# Patient Record
Sex: MALE | Race: WHITE | NOT HISPANIC OR LATINO | Employment: OTHER | URBAN - METROPOLITAN AREA
[De-identification: names, ages, dates, MRNs, and addresses within clinical notes are randomized per-mention and may not be internally consistent; named-entity substitution may affect disease eponyms.]

---

## 2020-07-10 ENCOUNTER — CONVERSION ENCOUNTER (OUTPATIENT)
Dept: FAMILY MEDICINE CLINIC | Facility: CLINIC | Age: 75
End: 2020-07-10

## 2020-07-10 ENCOUNTER — OFFICE VISIT CONVERTED (OUTPATIENT)
Dept: FAMILY MEDICINE CLINIC | Facility: CLINIC | Age: 75
End: 2020-07-10
Attending: NURSE PRACTITIONER

## 2020-07-10 ENCOUNTER — HOSPITAL ENCOUNTER (OUTPATIENT)
Dept: FAMILY MEDICINE CLINIC | Facility: CLINIC | Age: 75
Discharge: HOME | End: 2020-07-10
Attending: NURSE PRACTITIONER

## 2020-07-10 LAB
ALBUMIN SERPL-MCNC: 4.3 G/DL (ref 3.5–5)
ALBUMIN/GLOB SERPL: 1.7 {RATIO} (ref 1.4–2.6)
ALP SERPL-CCNC: 70 U/L (ref 56–155)
ALT SERPL-CCNC: 40 U/L (ref 10–40)
ANION GAP SERPL CALC-SCNC: 18 MMOL/L (ref 8–19)
AST SERPL-CCNC: 33 U/L (ref 15–50)
BASOPHILS # BLD AUTO: 0.03 10*3/UL (ref 0–0.2)
BASOPHILS NFR BLD AUTO: 0.5 % (ref 0–3)
BILIRUB SERPL-MCNC: 0.47 MG/DL (ref 0.2–1.3)
BUN SERPL-MCNC: 19 MG/DL (ref 5–25)
BUN/CREAT SERPL: 16 {RATIO} (ref 6–20)
CALCIUM SERPL-MCNC: 9.3 MG/DL (ref 8.7–10.4)
CHLORIDE SERPL-SCNC: 106 MMOL/L (ref 99–111)
CHOLEST SERPL-MCNC: 127 MG/DL (ref 107–200)
CHOLEST/HDLC SERPL: 3 {RATIO} (ref 3–6)
CONV ABS IMM GRAN: 0.03 10*3/UL (ref 0–0.2)
CONV CO2: 23 MMOL/L (ref 22–32)
CONV IMMATURE GRAN: 0.5 % (ref 0–1.8)
CONV TOTAL PROTEIN: 6.8 G/DL (ref 6.3–8.2)
CREAT UR-MCNC: 1.19 MG/DL (ref 0.7–1.2)
DEPRECATED RDW RBC AUTO: 41.7 FL (ref 35.1–43.9)
EOSINOPHIL # BLD AUTO: 0.1 10*3/UL (ref 0–0.7)
EOSINOPHIL # BLD AUTO: 1.7 % (ref 0–7)
ERYTHROCYTE [DISTWIDTH] IN BLOOD BY AUTOMATED COUNT: 12.8 % (ref 11.6–14.4)
GFR SERPLBLD BASED ON 1.73 SQ M-ARVRAT: 60 ML/MIN/{1.73_M2}
GLOBULIN UR ELPH-MCNC: 2.5 G/DL (ref 2–3.5)
GLUCOSE SERPL-MCNC: 95 MG/DL (ref 70–99)
HCT VFR BLD AUTO: 44.2 % (ref 42–52)
HDLC SERPL-MCNC: 42 MG/DL (ref 40–60)
HGB BLD-MCNC: 14.4 G/DL (ref 14–18)
LDLC SERPL CALC-MCNC: 50 MG/DL (ref 70–100)
LYMPHOCYTES # BLD AUTO: 2.35 10*3/UL (ref 1–5)
LYMPHOCYTES NFR BLD AUTO: 39.6 % (ref 20–45)
MCH RBC QN AUTO: 29.6 PG (ref 27–31)
MCHC RBC AUTO-ENTMCNC: 32.6 G/DL (ref 33–37)
MCV RBC AUTO: 90.8 FL (ref 80–96)
MONOCYTES # BLD AUTO: 0.4 10*3/UL (ref 0.2–1.2)
MONOCYTES NFR BLD AUTO: 6.7 % (ref 3–10)
NEUTROPHILS # BLD AUTO: 3.03 10*3/UL (ref 2–8)
NEUTROPHILS NFR BLD AUTO: 51 % (ref 30–85)
NRBC CBCN: 0 % (ref 0–0.7)
OSMOLALITY SERPL CALC.SUM OF ELEC: 296 MOSM/KG (ref 273–304)
PLATELET # BLD AUTO: 300 10*3/UL (ref 130–400)
PMV BLD AUTO: 11.5 FL (ref 9.4–12.4)
POTASSIUM SERPL-SCNC: 4.5 MMOL/L (ref 3.5–5.3)
PSA SERPL-MCNC: 4.59 NG/ML (ref 0–4)
RBC # BLD AUTO: 4.87 10*6/UL (ref 4.7–6.1)
SODIUM SERPL-SCNC: 142 MMOL/L (ref 135–147)
TRIGL SERPL-MCNC: 177 MG/DL (ref 40–150)
TSH SERPL-ACNC: 1.41 M[IU]/L (ref 0.27–4.2)
VLDLC SERPL-MCNC: 35 MG/DL (ref 5–37)
WBC # BLD AUTO: 5.94 10*3/UL (ref 4.8–10.8)

## 2020-07-11 LAB — 25(OH)D3 SERPL-MCNC: 55.7 NG/ML (ref 30–100)

## 2020-07-29 ENCOUNTER — OFFICE VISIT CONVERTED (OUTPATIENT)
Dept: SURGERY | Facility: CLINIC | Age: 75
End: 2020-07-29
Attending: NURSE PRACTITIONER

## 2020-07-29 ENCOUNTER — CONVERSION ENCOUNTER (OUTPATIENT)
Dept: SURGERY | Facility: CLINIC | Age: 75
End: 2020-07-29

## 2020-10-13 ENCOUNTER — OFFICE VISIT CONVERTED (OUTPATIENT)
Dept: FAMILY MEDICINE CLINIC | Facility: CLINIC | Age: 75
End: 2020-10-13
Attending: NURSE PRACTITIONER

## 2020-11-19 ENCOUNTER — HOSPITAL ENCOUNTER (OUTPATIENT)
Dept: FAMILY MEDICINE CLINIC | Facility: CLINIC | Age: 75
Discharge: HOME | End: 2020-11-19
Attending: NURSE PRACTITIONER

## 2020-11-19 LAB — PSA SERPL-MCNC: 3.8 NG/ML (ref 0–4)

## 2021-01-13 ENCOUNTER — OFFICE VISIT CONVERTED (OUTPATIENT)
Dept: FAMILY MEDICINE CLINIC | Facility: CLINIC | Age: 76
End: 2021-01-13
Attending: NURSE PRACTITIONER

## 2021-01-15 ENCOUNTER — HOSPITAL ENCOUNTER (OUTPATIENT)
Dept: ULTRASOUND IMAGING | Facility: HOSPITAL | Age: 76
Discharge: HOME | End: 2021-01-15
Attending: NURSE PRACTITIONER

## 2021-01-18 ENCOUNTER — CONVERSION ENCOUNTER (OUTPATIENT)
Dept: GASTROENTEROLOGY | Facility: CLINIC | Age: 76
End: 2021-01-18
Attending: INTERNAL MEDICINE

## 2021-01-25 ENCOUNTER — CONVERSION ENCOUNTER (OUTPATIENT)
Dept: OTOLARYNGOLOGY | Facility: CLINIC | Age: 76
End: 2021-01-25
Attending: NURSE PRACTITIONER

## 2021-03-04 ENCOUNTER — HOSPITAL ENCOUNTER (OUTPATIENT)
Dept: GASTROENTEROLOGY | Facility: HOSPITAL | Age: 76
Setting detail: HOSPITAL OUTPATIENT SURGERY
Discharge: HOME | End: 2021-03-04
Attending: INTERNAL MEDICINE

## 2021-04-14 ENCOUNTER — OFFICE VISIT CONVERTED (OUTPATIENT)
Dept: FAMILY MEDICINE CLINIC | Facility: CLINIC | Age: 76
End: 2021-04-14
Attending: NURSE PRACTITIONER

## 2021-04-14 ENCOUNTER — CONVERSION ENCOUNTER (OUTPATIENT)
Dept: FAMILY MEDICINE CLINIC | Facility: CLINIC | Age: 76
End: 2021-04-14

## 2021-04-14 ENCOUNTER — HOSPITAL ENCOUNTER (OUTPATIENT)
Dept: FAMILY MEDICINE CLINIC | Facility: CLINIC | Age: 76
Discharge: HOME | End: 2021-04-14
Attending: NURSE PRACTITIONER

## 2021-04-14 LAB
25(OH)D3 SERPL-MCNC: 45.2 NG/ML (ref 30–100)
ALBUMIN SERPL-MCNC: 4.3 G/DL (ref 3.5–5)
ALBUMIN/GLOB SERPL: 1.6 {RATIO} (ref 1.4–2.6)
ALP SERPL-CCNC: 61 U/L (ref 56–155)
ALT SERPL-CCNC: 26 U/L (ref 10–40)
ANION GAP SERPL CALC-SCNC: 17 MMOL/L (ref 8–19)
AST SERPL-CCNC: 31 U/L (ref 15–50)
BILIRUB SERPL-MCNC: 0.57 MG/DL (ref 0.2–1.3)
BUN SERPL-MCNC: 15 MG/DL (ref 5–25)
BUN/CREAT SERPL: 11 {RATIO} (ref 6–20)
CALCIUM SERPL-MCNC: 9.2 MG/DL (ref 8.7–10.4)
CHLORIDE SERPL-SCNC: 105 MMOL/L (ref 99–111)
CHOLEST SERPL-MCNC: 141 MG/DL (ref 107–200)
CHOLEST/HDLC SERPL: 2.9 {RATIO} (ref 3–6)
CONV CO2: 24 MMOL/L (ref 22–32)
CONV TOTAL PROTEIN: 7 G/DL (ref 6.3–8.2)
CREAT UR-MCNC: 1.33 MG/DL (ref 0.7–1.2)
GFR SERPLBLD BASED ON 1.73 SQ M-ARVRAT: 52 ML/MIN/{1.73_M2}
GLOBULIN UR ELPH-MCNC: 2.7 G/DL (ref 2–3.5)
GLUCOSE SERPL-MCNC: 94 MG/DL (ref 70–99)
HDLC SERPL-MCNC: 48 MG/DL (ref 40–60)
LDLC SERPL CALC-MCNC: 56 MG/DL (ref 70–100)
OSMOLALITY SERPL CALC.SUM OF ELEC: 293 MOSM/KG (ref 273–304)
POTASSIUM SERPL-SCNC: 4.6 MMOL/L (ref 3.5–5.3)
SODIUM SERPL-SCNC: 141 MMOL/L (ref 135–147)
TRIGL SERPL-MCNC: 183 MG/DL (ref 40–150)
VLDLC SERPL-MCNC: 37 MG/DL (ref 5–37)

## 2021-05-10 NOTE — H&P
"   History and Physical      Patient Name: Aristides Pineda   Patient ID: [id number]   Sex: Male   YOB: 1945    Primary Care Provider: Zaid AMOR   Referring Provider: Zaid AMOR    Visit Date: July 29, 2020    Provider: ZULEYMA Garcia   Location: [facility]   Location Address: [address] [place] [address]  [id number]   Location Phone: [phone]          Chief Complaint  · \"My doctor sent me because my blood test is high\"      History Of Present Illness  The patient is a 75 year old male, who presents on referral from Zaid AMOR, for an urological evaluation for an elevated PSA. The PSA was noted as elevated on 07/10/2020 and stated to be mildly elevated and at a level of 4.59 . There has not been a repeat PSA since the last elevated one on 07/10/2020   The patient also reports slowing of his stream and nocturia, 2-3 times a night. Additionally, he denies burning, chills, fever, frequency, hematuria, hesitancy, incomplete emptying, and previous UTI's.   The patient is currently not taking any Urology specific medications.   The patient's past medical history is non-contributory.   Petinent studies:  To date, no diagnostic studies have been performed. He has not had any recent care for this condition.      Patient admits to decreased urinary stream, trouble starting stream and reports it seems to cut in & out. Denies any scrotum or penis pain.  Denies any family history of prostate cancer.     No previous psa's available.       Past Medical History  Disease Name Date Onset Notes   Arthritis --  --    Colon cancer screening 8-10 years ago normal per patient   Deafness --  --    Hyperlipidemia --  --    Hypertension --  --    Ringing in ears --  --    Shortness of Breath --  --          Past Surgical History  Procedure Name Date Notes   Spinal Fusion [id number] --          Medication List  Name Date Started Instructions   aspirin 81 mg oral tablet,delayed release (/EC)  " take 1 tablet (81 mg) by oral route once daily   Bactrim -160 mg oral tablet 07/29/2020 take 1 tablet by oral route every 12 hours for 20 days   Fish Oil 1,000 mg (120 mg-180 mg) oral capsule  take 1 capsule by oral route once a day (in the morning)   flaxseed oil 1400 mg oral capsule  take 1 tab qd   lisinopril 10 mg oral tablet  take 1 tablet by oral route once a day (at bedtime)   Multivitamin  Take 1 tab qd   rosuvastatin 5 mg oral tablet  take 1 tablet (5 mg) by oral route once daily at bedtime   Vitamin B  Take 1 tab qd   Vitamin D 1000 iu  take 1 tab qd         Allergy List  Allergen Name Date Reaction Notes   NO KNOWN DRUG ALLERGIES --  --  --        Allergies Reconciled  Family Medical History  Disease Name Relative/Age Notes   Family history of cancer  --    Family history of diabetes mellitus  --          Social History  Finding Status Start/Stop Quantity Notes   Tobacco Former --/-- --  --          Review of Systems  · Constitutional  o Denies  o : fever, chills  · HENT  o Denies  o : sore throat, nasal congestion, nasal discharge, sinus pain, headaches  · Cardiovascular  o Denies  o : chest pain, cardiac murmurs, irregular heart beats, dyspnea on exertion  · Respiratory  o Denies  o : shortness of breath, wheezing  · Gastrointestinal  o Denies  o : nausea, vomiting, change in abdominal girth, diarrhea, constipation, blood in stools  · Genitourinary  o Admits  o : nocturia, urinary hesitancy, decreased stream  o Denies  o : urgency, frequency, dysuria, hematuria, pyuria, oliguria, change in urine color, incontinence, urinary retention, difficulty voiding, post-void dribbling, additional symptoms, except as noted in HPI  · Integument  o Denies  o : rash, itching, new skin lesions  · Neurologic  o Denies  o : tingling or numbness, incoordination, seizures  · Endocrine  o Denies  o : polyuria, polydipsia, cold intolerance, heat intolerance, weight gain, weight loss  · Psychiatric  o Denies  o :  "anxiety, depression      Vitals  Date Time BP Position Site L\R Cuff Size HR RR TEMP (F) WT  HT  BMI kg/m2 BSA m2 O2 Sat        07/29/2020 03:09 PM       12  171lbs 2oz 5'  6\" 27.62 1.9           Physical Examination  · Constitutional  o Appearance  o : Well nourished, well developed patient in no acute distress. Ambulating without difficulty.  · Neck  o Thyroid  o : Normal size without tenderness, nodules or masses  · Respiratory  o Respiratory Effort  o : Breathing is unlabored without accessory muscle use  o Auscultation of Lungs  o : Normal breath sounds  · Gastrointestinal  o Abdominal Examination  o : Scaphoid abdomen which is non-tender to palpation with normal tone and without rigidity or guarding. Normal bowel sounds. No masses present.  o Liver and spleen  o : No hepatomegaly present. Liver is non-tender to palpation and spleen is not palpable.  o Hernias  o : No abdominal wall hernias are present.  · Genitourinary  o Penis  o : Normal appearance without lesion, discharge or masses.  · Lymphatic  o Neck  o : No lymphadenopathy present  o Axilla  o : No lymphadenopathy present  o Groin  o : No lymphadenopathy present  · Skin and Subcutaneous Tissue  o General Inspection  o : No rashes, lesions or areas of discoloration present. Skin turgor is normal.  o General Palpation  o : No abnormalities, masses or tenderness on palpation.          Assessment  · Elevated prostate specific antigen (PSA)     790.93/R97.20  · Nocturia     788.43/R35.1      Plan  · Orders  o PSA ultrasensitive DIAGNOSTIC [facility] (77[address]) - 790.93/R97.20, 788.43/R35.1 - 09/09/2020  · Medications  o Medications have been Reconciled  o Transition of Care or Provider Policy  · Instructions  o The patient's PSA is elevated on initial exam. I have discussed the causes of an elevated PSA. I have made recommendations and I have the patient return in follow-up for a recheck of the PSA.  o Electronically Identified Patient Education Materials Provided " Electronically            Electronically Signed by: ZULEYMA Garcia -Author on July 29, 2020 08:44:27 PM

## 2021-05-10 NOTE — H&P
History and Physical      Patient Name: Aristides Pineda   Patient ID: [id number]   Sex: Male   YOB: 1945    Primary Care Provider: James AMOR   Referring Provider: James AMOR    Visit Date: January 18, 2021    Provider: Rell Barreto MD   Location: [facility]   Location Address: [address] [place] [address]  [id number]   Location Phone: [phone]          Chief Complaint  · Surgical History and Physical  · Screening Colonoscopy      History Of Present Illness  NON-INPATIENT HISTORY AND PHYSICAL  Allergies: NO KNOWN DRUG ALLERGIES and bromocriptine   Chief Complaint/History of Present Illness: Colon screening, No family history of colon cancer   Colon Recall: No   Failed Outpatient Treatment/Contraindications: N/A   Current Medications: aspirin 81 mg oral tablet,delayed release (DR/EC), Fish Oil 1,000 mg (120 mg-180 mg) oral capsule, flaxseed oil 1400 mg oral capsule, lisinopril 10 mg oral tablet, Multivitamin, rosuvastatin 5 mg oral tablet, Suprep Bowel Prep Kit 17.5-3.13-1.6 gram oral recon soln, Vitamin B, and Vitamin D 1000 iu   Significant Past Medical History: Arthritis, Colon cancer screening, Deafness, Hyperlipidemia, Hypertension, Ringing in ears, and Shortness of Breath   Significant Family Medical History: No family history of colon cancer   Significant Past Surgical History: Spinal Fusion   Previous Colonoscopy: Yes YEAR: 2011 By Whom: [place]   Previous EGD: No   PHYSICAL EXAM:  Heart: Regular Rate and Rhythm   Lungs: Breathing Unlabored           Assessment  · Preoperative examination     V72.84/Z01.818  · Screening for colon cancer     V76.51/Z12.11      Plan  · Orders  o Consent for Colonoscopy Screening -Possible risk/complications, benefits, and alternatives to surgical or invasive procedure have been explained to patient and/or legal gaurdian. -Patient has been evaluated and can tolerate anethesia and/or sedation. Risk, benefits, and  alternatives to anesthesia and sedation have been explained to patient or legal gaurdian. () - V72.84/Z01.818, V76.51/Z12.11 - 03/04/2021  · Medications  o Medications have been Reconciled  o Transition of Care or Provider Policy  · Instructions  o ****Surgical Orders****  o ***************  o Outpatient  o ***************  o RISK AND BENEFITS:  o Possible risks/complications, benefits and alternatives to surgical or invasive procedure have been explained to the patient and/or legal guardian.  o Patient has been evaluated and can tolerate anesthesia and/or sedation. Risks, benefits, and alternatives to anesthesia and sedation have been explained to the patient and/or legal guardian.  o ***************  o PREP: Per protocol  o IV: Per Anesthesia  o The above History and Physical Examination has been completed within 30 days of admission.  o This note has been transcribed by KAT Husain. I have read and agree with the findings in this note.  o Electronically Identified Patient Education Materials Provided Electronically            Electronically Signed by: Ching Reyes MA -Author on January 18, 2021 11:18:18 AM

## 2021-05-10 NOTE — H&P
History and Physical      Patient Name: Aristides Pineda   Patient ID: [id number]   Sex: Male   YOB: 1945        Visit Date: July 10, 2020    Provider: ZULEYMA Burns   Location: [facility]   Location Address: [address] [place] [address], [address] [place] [address]  [id number]   Location Phone: [phone]          Chief Complaint  · NEW PT - EST CARE      History Of Present Illness  Aristides Pineda is a 75 year old male who presents for evaluation and treatment of:      Patient is here to establish care.  He recently to [place] from [place] 2 months ago because his family lives here.  He has a history of arthritis, hypertension, hyperlipidemia, shortness of breath when exercising, and is hard of hearing.  He states that he takes all his medications as prescribed and does not take any additional over-the-counter medications.    He has a history of tobacco use he quit in 1971.  He smoked for 6 to 8 years about a pack a day.  He denies any alcohol or substance abuse.    He is up-to-date on his pneumonia vaccine and had a colonoscopy 8 years ago with normal results.    He has not complaints at this time.       Past Medical History  No Pertinent Medical History         Past Surgical History  Spinal Fusion         Medication List  aspirin 81 mg oral tablet,delayed release (DR/EC); Fish Oil 1,000 mg (120 mg-180 mg) oral capsule; flaxseed oil 1400 mg oral capsule; lisinopril 10 mg oral tablet; Multivitamin; rosuvastatin 5 mg oral tablet; Vitamin B; Vitamin D 1000 iu         Allergy List  NO KNOWN DRUG ALLERGIES         Family Medical History  Family history of cancer; Family history of diabetes mellitus         Social History  Tobacco (Former)         Review of Systems  · Constitutional  o Denies  o : fever, fatigue, weight loss, weight gain  · Eyes  o Denies  o : impaired vision  · HENT  o Admits  o : hearing loss  o Denies  o : headaches  · Cardiovascular  o Denies  o : lower extremity edema, claudication, chest  "pressure, palpitations  · Respiratory  o Admits  o : shortness of breath with exercise  o Denies  o : wheezing, cough, hemoptysis, dyspnea on exertion  · Gastrointestinal  o Denies  o : nausea, vomiting, diarrhea, constipation, abdominal pain  · Genitourinary  o Denies  o : urgency, frequency, dysuria, incontinence, urinary retention, difficulty voiding, decreased stream  · Integument  o Denies  o : skin dryness, hair growth change  · Neurologic  o Admits  o : occasional dizziness with change in position from kneeling to standing  o Denies  o : altered mental status, muscular weakness, difficulty concentrating  · Musculoskeletal  o Denies  o : joint pain, muscular weakness, muscle cramps  · Endocrine  o Denies  o : loss of hair, constipation  · Psychiatric  o Denies  o : anxiety, depression      Vitals  Date Time BP Position Site L\R Cuff Size HR RR TEMP (F) WT  HT  BMI kg/m2 BSA m2 O2 Sat        07/10/2020 09:02 /58 Sitting    58 - R  97.5 170lbs 4oz 5'  6\" 27.48 1.9 98 %          Physical Examination  · Constitutional  o Appearance  o : well-nourished, well developed, alert, in no acute distress, well-tended appearance  · Head and Face  o Head  o :   § Inspection  § : atraumatic, normocephalic  o Face  o :   § Inspection  § : no facial lesions  o HEENT  o : Unremarkable  · Eyes  o Conjunctivae  o : conjunctivae normal  o Sclerae  o : sclerae white  o Pupils and Irises  o : pupils equal and round, pupils reactive to light bilaterally  o Eyelids/Ocular Adnexae  o : eyelid appearance normal  · Ears, Nose, Mouth and Throat  o Ears  o :   § External Ears  § : appearance within normal limits, no lesions present  § Otoscopic Examination  § : tympanic membrane appearance within normal limits bilaterally  o Nose  o :   § External Nose  § : appearance normal  o Oral Cavity  o :   § Oral Mucosa  § : oral mucosa normal  § Lips  § : lip appearance normal  § Teeth  § : normal dentition for age  § Gums  § : gums pink, " non-swollen, no bleeding present  § Tongue  § : tongue appearance normal  § Palate  § : hard palate normal, soft palate appearance normal  o Throat  o : No erythema throat, tonsils +1  · Neck  o Inspection/Palpation  o : normal appearance, no masses or tenderness, trachea midline  o Thyroid  o : gland size normal, nontender, no nodules or masses present on palpation  · Respiratory  o Respiratory Effort  o : breathing unlabored, no accessory muscle use  o Auscultation of Lungs  o : normal breath sounds  · Cardiovascular  o Heart  o :   § Auscultation of Heart  § : regular rate, normal rhythm, no murmurs present  o Peripheral Vascular System  o :   § Extremities  § : no edema  · Gastrointestinal  o Abdominal Examination  o : abdomen nontender to palpation, normal bowel sounds, tone normal without rigidity or guarding, no masses present  o Liver and spleen  o : no hepatomegaly present  · Lymphatic  o Neck  o : no lymphadenopathy   o Supraclavicular Nodes  o : no supraclavicular nodes          Assessment  · Screening for depression     V79.0/Z13.89  · Screening for prostate cancer     V76.44/Z12.5  Check PSA  · Essential hypertension     401.9/I10  Check CBC, CMP, and TSH  · Hyperlipidemia     272.4/E78.5  Check lipid panel  · Osteoarthritis     715.90/M19.90  · Vitamin D deficiency     268.9/E55.9  Check vitamin D  · Hard of hearing     389.9/H91.90  · Shortness of breath on exertion     786.05/R06.02    Problems Reconciled  Plan  · Orders  o Annual depression screening, 15 minutes (, 43392) - V79.0/Z13.89 - 07/10/2020  o ACO - Pt declines to or was not able to provide an Advance Care Plan or name a Surrogate Decision Maker (1124F) - - 07/10/2020  o Male Physical Primary Care Panel (CMP, CBC, TSH, Lipid, PSA) [facility] (27191, 88328, 04323, 73932, 62202, ) - 272.4/E78.5, 401.9/I10, V76.44/Z12.5 - 07/10/2020  o Vitamin D (25-Hydroxy) Level (52777) - 268.9/E55.9 - 07/10/2020  o ACO-39: Current medications updated and  reviewed () - - 07/10/2020  o ACO-18: Negative screen for clinical depression using a standardized tool () - - 07/10/2020   0 pts  o ACO-15: Pneumococcal Vaccine Administered or Previously Received (4040F) - - 07/10/2020  o ACO-14: Influenza immunization was not administered for reasons documented () - - 07/10/2020   pt did not receive  o ACO-13: Fall Risk Screening with no falls in past year or only one fall without injury in the past year (1101F) - - 07/10/2020  · Medications  o Medications have been Reconciled  o Transition of Care or Provider Policy  · Instructions  o Depression Screen completed and scanned into the EMR under the designated folder within the patient's documents.  o Today's PHQ-9 result is _0__  o Patient advised to monitor blood pressure (B/P) at home and journal readings. Patient informed that a B/P reading at home of more than 130/80 is considered hypertension. For readings greater vtbl262/90 or higher patient is advised to follow up in the office with readings for management. Patient advised to limit sodium intake.  o Advised that cheeses and other sources of dairy fats, animal fats, fast food, and the extras (candy, pastries, pies, doughnuts and cookies) all contain LDL raising nutrients. Advised to increase fruits, vegetables, whole grains, and to monitor portion sizes.   o Patient was educated/instructed on their diagnosis, treatment and medications prior to discharge from the clinic today.  o Patient instructed to seek medical attention urgently for new or worsening symptoms.  o Call the office with any concerns or questions.  · Disposition  o Call or Return if symptoms worsen or persist.  o f/u 3 months            Electronically Signed by: Zaid Love APRN -Author on July 10, 2020 09:51:47 AM

## 2021-05-10 NOTE — H&P
History and Physical      Patient Name: Aristides Pineda   Patient ID: [id number]   Sex: Male   YOB: 1945    Primary Care Provider: James AMOR   Referring Provider: James AMOR    Visit Date: January 25, 2021    Provider: ZULEYMA Kasper   Location: [facility]   Location Address: [address] [place] [address], [address] [place] [address]  [id number]   Location Phone: [phone]          Chief Complaint     1.  Hearing loss    2.  Tinnitus       History Of Present Illness  Aristides Pineda is a 75 year old male who presents to the office today as a consult from James AMOR.      He presents to the clinic today for evaluation of his ears and hearing.  He reports for several years he has had ringing in both ears.  He states that he feels like his hearing has changed slightly.  He reports that he often has to turn his television up very loudly to be able to hear.  However, he states that he does not have much trouble in face-to-face conversation.  He denies any issues with his ears.  He has never had any recurrent otitis media, otalgia or otorrhea.  He reports riding a motorcycle for many years without hearing protection.       Past Medical History  Arthritis; Colon cancer screening; Deafness; Hyperlipidemia; Hypertension; Ringing in ears; Shortness of Breath; Tinnitus         Past Surgical History  Spinal Fusion; Tonsillectomy         Medication List  aspirin 81 mg oral tablet,delayed release (DR/EC); Fish Oil 1,000 mg (120 mg-180 mg) oral capsule; flaxseed oil 1400 mg oral capsule; lisinopril 10 mg oral tablet; Multivitamin; rosuvastatin 5 mg oral tablet; Suprep Bowel Prep Kit 17.5-3.13-1.6 gram oral recon soln; Vitamin B; Vitamin D 1000 iu         Allergy List  bromocriptine       Allergies Reconciled  Family Medical History  No family history of malignant neoplasm; Family history of cancer; Family history of diabetes mellitus         Social History  Tobacco (Former)  "        Review of Systems  · Constitutional  o Denies  o : fever, night sweats, weight loss  · Eyes  o Denies  o : discharge from eye, impaired vision  · HENT  o Admits  o : *See HPI  · Cardiovascular  o Denies  o : chest pain, irregular heart beats  · Respiratory  o Denies  o : shortness of breath, wheezing, coughing up blood  · Gastrointestinal  o Denies  o : heartburn, reflux, vomiting blood  · Genitourinary  o Denies  o : frequency  · Integument  o Denies  o : rash, skin dryness  · Neurologic  o Denies  o : seizures, loss of balance, loss of consciousness, dizziness  · Endocrine  o Denies  o : cold intolerance, heat intolerance  · Heme-Lymph  o Denies  o : easy bleeding, anemia      Vitals  Date Time BP Position Site L\R Cuff Size HR RR TEMP (F) WT  HT  BMI kg/m2 BSA m2 O2 Sat FR L/min FiO2 HC       01/25/2021 01:22 PM        97.3 185lbs 6oz 5'  6\" 29.92 1.98             Physical Examination  · Constitutional  o Appearance  o : well developed, well-nourished, alert and in no acute distress, voice clear and strong  · Head and Face  o Head  o :   § Inspection  § : no deformities or lesions  o Face  o :   § Inspection  § : No facial lesions; House-Brackmann I/VI bilaterally  § Palpation  § : No TMJ crepitus nor  muscle tenderness bilaterally  · Eyes  o Vision  o :   § Visual Fields  § : Extraocular movements are intact. No spontaneous or gaze-induced nystagmus.  o Conjunctivae  o : clear  o Sclerae  o : clear  o Pupils and Irises  o : pupils equal, round, and reactive to light.   · Ears, Nose, Mouth and Throat  o Ears  o :   § External Ears  § : appearance within normal limits, no lesions present  § Otoscopic Examination  § : tympanic membrane appearance within normal limits bilaterally without perforations, well-aerated middle ears  § Hearing  § : intact to conversational voice both ears  o Nose  o :   § External Nose  § : appearance normal  § Intranasal Exam  § : mucosa within normal limits, vestibules " normal, no intranasal lesions present, septum midline, sinuses non tender to percussion  o Oral Cavity  o :   § Oral Mucosa  § : oral mucosa normal without pallor or cyanosis  § Lips  § : lip appearance normal  § Teeth  § : normal dentition for age  § Gums  § : gums pink, non-swollen, no bleeding present  § Tongue  § : tongue appearance normal; normal mobility  § Palate  § : hard palate normal, soft palate appearance normal with symmetric mobility  o Throat  o :   § Oropharynx  § : no inflammation or lesions present, tonsils within normal limits  · Neck  o Inspection/Palpation  o : normal appearance, no masses or tenderness, trachea midline; thyroid size normal, nontender, no nodules or masses present on palpation  · Respiratory  o Respiratory Effort  o : breathing unlabored  o Inspection of Chest  o : normal appearance, no retractions  · Lymphatic  o Neck  o : no lymphadenopathy present  o Supraclavicular Nodes  o : no lymphadenopathy present  o Preauricular Nodes  o : no lymphadenopathy present  · Skin and Subcutaneous Tissue  o General Inspection  o : Regarding face and neck - there are no rashes present, no lesions present, and no areas of discoloration  · Neurologic  o Cranial Nerves  o : cranial nerves II-XII are grossly intact bilaterally  o Gait and Station  o : normal gait, able to stand without diffculty  · Psychiatric  o Judgement and Insight  o : judgment and insight intact  o Mood and Affect  o : mood normal, affect appropriate          Assessment  · Sensorineural hearing loss (SNHL), bilateral     389.18/H90.3  · Tinnitus, bilateral     388.30/H93.13      Plan  · Orders  o Audiometry, pure-tone (threshold); [facility] (87293) - 389.18/H90.3, 388.30/H93.13 - 01/25/2021  o Tympanogram (Impedance Testing) [facility] (27970) - 389.18/H90.3, 388.30/H93.13 - 01/25/2021  · Medications  o Medications have been Reconciled  o Transition of Care or Provider Policy  · Instructions  o He presents to the clinic today for  evaluation of his ears and hearing. He reports for several years he has had ringing in both ears. He states that he feels like his hearing has changed slightly. He reports that he often has to turn his television up very loudly to be able to hear. However, he states that he does not have much trouble in face-to-face conversation. He denies any issues with his ears. He has never had any recurrent otitis media, otalgia or otorrhea. He reports riding a motorcycle for many years without hearing protection. On examination today bilateral external auditory canals and bilateral tympanic membrane appearance is within normal limits. There are no perforations and middle ears are well aerated. We did obtain an audiogram and tympanogram today. Audiogram shows normal hearing sloping to moderately severe sensorineural hearing loss bilaterally. He essentially has normal to borderline normal hearing through 2000 Hz. Speech reception threshold was at 20 dB bilaterally. Word discrimination scores were 92% on the right and 100% on the left at 60 dB. Tympanograms were normal bilaterally. We have gone over the results of the audiogram and I have also given a copy to the patient. Given his speech audiometry scores he is a great candidate for hearing aids. However, at this time he does not feel like he is having much trouble in conversation. We discussed his tinnitus symptoms and how it is related to the high-frequency nerve type hearing loss. I have encouraged him to use background noise as a distraction technique to help with his tinnitus symptoms. I will plan to see him back on an as-needed basis.  o Electronically Identified Patient Education Materials Provided Electronically  · Correspondence  o ENT Letter to Referring MD (James AMOR) - 01/25/2021            Electronically Signed by: ZULEYMA Kasper -Author on January 25, 2021 02:37:15 PM

## 2021-05-13 NOTE — PROGRESS NOTES
Progress Note      Patient Name: Aristdies Pineda   Patient ID: [id number]   Sex: Male   YOB: 1945    Primary Care Provider: James AMOR   Referring Provider: James AMOR    Visit Date: October 13, 2020    Provider: ZULEYMA Ashley   [place] [facility] [place]   Location Address: [address] [place] [address], [address]  [place], [place]  [id number]   Location Phone: [phone]          Chief Complaint  · 3 mo f/u      History Of Present Illness  Aristides Pineda is a 75 year old male who presents for evaluation and treatment of:      Resents today for 3-month follow-up for hypertension hyperlipidemia.  He states his blood pressure at home is approximately 1201 26/6070.  Denies chest pain, syncope, and palpitations.  He declines a flu vaccine.  He is up-to-date on his pneumonia and colonoscopy.    He had an elevated PSA he is seeing urology and will have a repeat PSA.       Past Medical History  Arthritis; Colon cancer screening; Deafness; Hyperlipidemia; Hypertension; Ringing in ears; Shortness of Breath         Past Surgical History  Spinal Fusion         Medication List  aspirin 81 mg oral tablet,delayed release (DR/EC); Fish Oil 1,000 mg (120 mg-180 mg) oral capsule; flaxseed oil 1400 mg oral capsule; lisinopril 10 mg oral tablet; Multivitamin; rosuvastatin 5 mg oral tablet; Vitamin B; Vitamin D 1000 iu         Allergy List  NO KNOWN DRUG ALLERGIES         Family Medical History  Family history of cancer; Family history of diabetes mellitus         Social History  Tobacco (Former)         Review of Systems  · Constitutional  o Denies  o : fatigue  · Eyes  o Denies  o : blurred vision, changes in vision  · HENT  o Denies  o : headaches  · Cardiovascular  o Denies  o : chest pain, irregular heart beats, rapid heart rate, dyspnea on exertion  · Respiratory  o Denies  o : shortness of breath, wheezing, cough  · Gastrointestinal  o Denies  o : nausea, vomiting, diarrhea, constipation,  "abdominal pain, blood in stools, melena  · Genitourinary  o Denies  o : frequency, dysuria, hematuria  · Integument  o Denies  o : rash, new skin lesions  · Musculoskeletal  o Denies  o : joint pain, joint swelling, muscle pain  · Endocrine  o Denies  o : central obesity, polyuria, polydipsia  · Psychiatric  o Denies  o : anxiety, depression      Vitals  Date Time BP Position Site L\R Cuff Size HR RR TEMP (F) WT  HT  BMI kg/m2 BSA m2 O2 Sat FR L/min FiO2        10/13/2020 08:11 /60 Sitting    54 - R  96.8 175lbs 16oz 5'  6\" 28.41 1.93 99 %            Physical Examination  · Constitutional  o Appearance  o : alert, in no acute distress  · Head and Face  o Head  o :   § Inspection  § : atraumatic, normocephalic  o Face  o :   § Inspection  § : no facial lesions  o HEENT  o : Unremarkable  · Eyes  o Conjunctivae  o : conjunctivae normal  o Sclerae  o : sclerae white  o Pupils and Irises  o : pupils equal and round, pupils reactive to light bilaterally  o Eyelids/Ocular Adnexae  o : eyelid appearance normal  · Neck  o Inspection/Palpation  o : normal appearance, no masses or tenderness, trachea midline  o Thyroid  o : gland size normal, nontender, no nodules or masses present on palpation  · Respiratory  o Respiratory Effort  o : breathing unlabored  o Auscultation of Lungs  o : normal breath sounds  · Cardiovascular  o Heart  o :   § Auscultation of Heart  § : regular rate, normal rhythm, no murmurs present  o Peripheral Vascular System  o :   § Extremities  § : no edema  · Gastrointestinal  o Abdominal Examination  o : abdomen nontender to palpation, normal bowel sounds, tone normal without rigidity or guarding, no masses present  o Liver and spleen  o : no hepatomegaly present  · Lymphatic  o Neck  o : no lymphadenopathy           Assessment  · Need for influenza vaccination     V04.81/Z23  Declines flu vaccine  · Hyperlipidemia     272.4/E78.5  Continue the lovastatin 5 mg daily. His cholesterol is " controlled.  · Hypertension     401.9/I10  Blood pressure is controlled. Continue the lisinopril 10 mg daily      Plan  · Orders  o ACO-39: Current medications updated and reviewed (, 1159F) - - 10/13/2020  o ACO-15: Pneumococcal Vaccine Administered or Previously Received [facility] (4040F) - - 10/13/2020  · Medications  o lisinopril 10 mg oral tablet   SIG: take 1 tablet by oral route daily for 90 days   DISP: (90) Tablet with 1 refills  Prescribed on 10/13/2020     o rosuvastatin 5 mg oral tablet   SIG: take 1 tablet (5 mg) by oral route once daily at bedtime for 90 days   DISP: (90) Tablet with 1 refills  Prescribed on 10/13/2020     o Medications have been Reconciled  o Transition of Care or Provider Policy  · Instructions  o Flu vaccine declined.  o Advised that cheeses and other sources of dairy fats, animal fats, fast food, and the extras (candy, pastries, pies, doughnuts and cookies) all contain LDL raising nutrients. Advised to increase fruits, vegetables, whole grains, and to monitor portion sizes.   o Patient was educated/instructed on their diagnosis, treatment and medications prior to discharge from the clinic today.  o Patient instructed to seek medical attention urgently for new or worsening symptoms.  o Call the office with any concerns or questions.  · Disposition  o Call or Return if symptoms worsen or persist.  o f/u 3 months            Electronically Signed by: ZULEYMA Ashley -Author on October 13, 2020 08:27:41 AM

## 2021-05-14 VITALS
HEIGHT: 66 IN | BODY MASS INDEX: 29.25 KG/M2 | SYSTOLIC BLOOD PRESSURE: 134 MMHG | HEART RATE: 68 BPM | TEMPERATURE: 98.2 F | DIASTOLIC BLOOD PRESSURE: 76 MMHG | WEIGHT: 182 LBS | OXYGEN SATURATION: 99 %

## 2021-05-14 VITALS
HEART RATE: 54 BPM | OXYGEN SATURATION: 99 % | BODY MASS INDEX: 28.28 KG/M2 | WEIGHT: 176 LBS | HEIGHT: 66 IN | DIASTOLIC BLOOD PRESSURE: 60 MMHG | TEMPERATURE: 96.8 F | SYSTOLIC BLOOD PRESSURE: 134 MMHG

## 2021-05-14 VITALS
OXYGEN SATURATION: 97 % | BODY MASS INDEX: 29.49 KG/M2 | SYSTOLIC BLOOD PRESSURE: 130 MMHG | TEMPERATURE: 97 F | WEIGHT: 183.5 LBS | HEART RATE: 62 BPM | DIASTOLIC BLOOD PRESSURE: 72 MMHG | HEIGHT: 66 IN

## 2021-05-14 VITALS — BODY MASS INDEX: 29.79 KG/M2 | HEIGHT: 66 IN | TEMPERATURE: 97.3 F | WEIGHT: 185.37 LBS

## 2021-05-14 NOTE — PROGRESS NOTES
Progress Note      Patient Name: Aristides Pineda   Patient ID: [id number]   Sex: Male   YOB: 1945    Primary Care Provider: James AMOR   Referring Provider: James AMOR    Visit Date: April 14, 2021    Provider: ZULEYMA Ashley   [facility]   Location Address: [address] [place] [address], [address] [place] [address]  [id number]   Location Phone: [phone]          Chief Complaint  · 3 month f/u      History Of Present Illness  Aristides Pineda is a 75 year old male who presents for evaluation and treatment of:      Presents today for 3-month follow-up on hypertension, hyperlipidemia, and vitamin D deficiency.  Overall he states he is doing well.  Blood pressure today is 130/72.  Denies chest pain, syncope, palpitations.  He is a former smoker.  He only smoked for possibly 10 years.  Quit when he was 26 years old.       Past Medical History  Arthritis; Colon cancer screening; Deafness; Hyperlipidemia; Hypertension; Ringing in ears; Shortness of Breath; Tinnitus         Past Surgical History  Colonoscopy; Spinal Fusion; Tonsillectomy         Medication List  aspirin 81 mg oral tablet,delayed release (DR/EC); Fish Oil 1,000 mg (120 mg-180 mg) oral capsule; flaxseed oil 1400 mg oral capsule; lisinopril 10 mg oral tablet; Multivitamin; rosuvastatin 5 mg oral tablet; Vitamin B; Vitamin D 1000 iu         Allergy List  bromocriptine       Allergies Reconciled  Family Medical History  No family history of malignant neoplasm; Family history of cancer; Family history of diabetes mellitus         Social History  Tobacco (Former)         Review of Systems  · Constitutional  o Denies  o : fatigue, fever, body aches, night sweats  · Eyes  o Denies  o : double vision, blurred vision  · HENT  o Denies  o : vertigo, recent head injury  · Cardiovascular  o Denies  o : chest pain, irregular heart beats, rapid heart rate, dyspnea on exertion, lower extremity  "edema  · Respiratory  o Denies  o : shortness of breath, productive cough  · Gastrointestinal  o Denies  o : nausea, vomiting, reflux, abdominal pain  · Genitourinary  o Denies  o : dysuria, urinary retention  · Integument  o Denies  o : hair growth change, new skin lesions  · Neurologic  o Denies  o : altered mental status, seizures  · Musculoskeletal  o Denies  o : joint swelling, limitation of motion  · Endocrine  o Denies  o : cold intolerance, heat intolerance  · Heme-Lymph  o Denies  o : petechiae, lymph node enlargement or tenderness  · Allergic-Immunologic  o Denies  o : frequent illnesses      Vitals  Date Time BP Position Site L\R Cuff Size HR RR TEMP (F) WT  HT  BMI kg/m2 BSA m2 O2 Sat FR L/min FiO2 HC       04/14/2021 09:05 /72 Sitting    62 - R  97 183lbs 8oz 5'  6\" 29.62 1.97 97 %            Physical Examination  · Constitutional  o Appearance  o : alert, in no acute distress  · Head and Face  o Head  o :   § Inspection  § : atraumatic, normocephalic  o Face  o :   § Inspection  § : no facial lesions  o HEENT  o : Unremarkable  · Eyes  o Conjunctivae  o : conjunctivae normal  o Sclerae  o : sclerae white  o Pupils and Irises  o : pupils equal and round, pupils reactive to light bilaterally  o Eyelids/Ocular Adnexae  o : eyelid appearance normal  · Ears, Nose, Mouth and Throat  o Ears  o :   § External Ears  § : appearance within normal limits, no lesions present  o Nose  o :   § External Nose  § : appearance normal  o Oral Cavity  o :   § Oral Mucosa  § : oral mucosa normal  § Lips  § : lip appearance normal  § Teeth  § : normal dentition for age  § Gums  § : gums pink, non-swollen, no bleeding present  § Tongue  § : tongue appearance normal  § Palate  § : hard palate normal, soft palate appearance normal  · Neck  o Inspection/Palpation  o : normal appearance, no masses or tenderness, trachea midline  o Thyroid  o : gland size normal, nontender, no nodules or masses present on " palpation  · Respiratory  o Respiratory Effort  o : breathing unlabored  o Auscultation of Lungs  o : normal breath sounds  · Cardiovascular  o Heart  o :   § Auscultation of Heart  § : regular rate, normal rhythm, no murmurs present  o Peripheral Vascular System  o :   § Extremities  § : no edema  · Gastrointestinal  o Abdominal Examination  o : abdomen nontender to palpation, normal bowel sounds, tone normal without rigidity or guarding, no masses present  o Liver and spleen  o : no hepatomegaly present  · Lymphatic  o Neck  o : no lymphadenopathy   o Supraclavicular Nodes  o : no supraclavicular nodes          Assessment  · Essential hypertension     401.9/I10  . continue current regimen. Check CMP  · Hyperlipidemia     272.4/E78.5  Well-controlled. Continue current regimen. Check lipid panel  · Vitamin D deficiency     268.9/E55.9  Check vitamin D      Plan  · Orders  o HTN/Lipid Panel (CMP, Lipid) [facility] ([address]56, [address]) - 401.9/I10 - 04/14/2021  o Vitamin D Level (28035) - 268.9/E55.9 - 04/14/2021  o ACO-15: Pneumococcal Vaccine Administered or Previously Received [facility] (4040F) - - 04/14/2021   pt recieved at another office.  o ACO-14: Influenza immunization was not administered for reasons documented [facility] () - - 04/14/2021   pt declined  o ACO-39: Current medications updated and reviewed (1159F, ) - - 04/14/2021  · Medications  o lisinopril 10 mg oral tablet   SIG: take 1 tablet by oral route daily for 90 days   DISP: (90) Tablet with 1 refills  Refilled on 04/14/2021     o rosuvastatin 5 mg oral tablet   SIG: take 1 tablet (5 mg) by oral route once daily at bedtime for 90 days   DISP: (90) Tablet with 1 refills  Refilled on 04/14/2021     · Instructions  o Patient advised to monitor blood pressure (B/P) at home and journal readings. Patient informed that a B/P reading at home of more than 130/80 is considered hypertension. For readings greater tudm205/90 or higher patient is advised to follow up in the  office with readings for management. Patient advised to limit sodium intake.  o Advised that cheeses and other sources of dairy fats, animal fats, fast food, and the extras (candy, pastries, pies, doughnuts and cookies) all contain LDL raising nutrients. Advised to increase fruits, vegetables, whole grains, and to monitor portion sizes.   o Patient was educated/instructed on their diagnosis, treatment and medications prior to discharge from the clinic today.  o Patient instructed to seek medical attention urgently for new or worsening symptoms.  o Call the office with any concerns or questions.  · Disposition  o Call or Return if symptoms worsen or persist.  o f/u 3 months            Electronically Signed by: ZULEYMA Ashley -Author on April 14, 2021 09:49:26 AM

## 2021-05-14 NOTE — PROGRESS NOTES
Progress Note      Patient Name: Aristides Pineda   Patient ID: [id number]   Sex: Male   YOB: 1945    Primary Care Provider: James AMOR   Referring Provider: James AMOR    Visit Date: January 13, 2021    Provider: ZULEYMA Ashley   Location: [facility]   Location Address: [address] [place] [address], [address] [place] [address]  [id number]   Location Phone: [phone]          Chief Complaint  · Welcome to Medicare Visit      History Of Present Illness  The patient is a 75 year old male who has come to this office for his Welcome to Medicare Visit. His Primary Care Provider is James AMOR. His comprehensive Care Team list, including suppliers, has been updated on the Facesheet. His medical/surgical/family history, height, weight, BMI, and blood pressure have been reviewed and are in the chart.   Medications are listed in the medication list.   The active problem list includes: Arthritis, Deafness, Hyperlipidemia, Hypertension, Ringing in ears, and Shortness of Breath   The patient does not have a history of substance use.   Patient reports his diet is adequate.   Patient reports his physical activity is adequate.   A hearing loss screen was completed today and the result is positive, indicating a need for further evaluation.   Patient does not have any risk factors for depression. Patient completed the PHQ-9 today and it has been scanned in the chart. The total score is 1-4.   The Timed-Up-and-Go screen was administered today and the result is negative.   The Zarco Index of [place] in ADLs indicated full function (score of 6).   A Falls Risk Assessment has been completed, including a review of home fall hazards and medication review.   His level of safety is noted to be within normal limits. His balance/gait is within normal limits. There have been no falls in the past year. Patient-specific home safety recommendations have been reviewed and a copy has been given  to patient.   He admits issues with leaking urine. This happens infrequently and he would like to discuss this further today.   There are no additional risk factors identified.   Living Will/Advanced Directive has not previously been completed.   Personalized health advice was given to the patient and a written health screening schedule was established; see Plan for details.      He has a history of hypertension and hyperlipidemia.  He does not check his blood pressure at home.  BP slightly elevated 134/76.  He states he will start checking his blood pressure at home.  Denies chest pain, syncope, palpitation.    He has a history of tinnitus in both ears.  He does not recall when the tinnitus is started.  He has hearing loss screen test was abnormal.    He is a former smoker.  Quit smoking 35 years ago    History of colonoscopy with polypectomy 5 to 7 years ago.  He had the colonoscopy done in [facility].   Aristides Pineda is a 75 year old male who presents for evaluation and treatment of:       Past Medical History  Arthritis; Colon cancer screening; Deafness; Hyperlipidemia; Hypertension; Ringing in ears; Shortness of Breath         Past Surgical History  Spinal Fusion         Medication List  aspirin 81 mg oral tablet,delayed release (DR/EC); Fish Oil 1,000 mg (120 mg-180 mg) oral capsule; flaxseed oil 1400 mg oral capsule; lisinopril 10 mg oral tablet; Multivitamin; rosuvastatin 5 mg oral tablet; Vitamin B; Vitamin D 1000 iu         Allergy List  NO KNOWN DRUG ALLERGIES         Family Medical History  Family history of cancer; Family history of diabetes mellitus         Social History  Tobacco (Former)         Review of Systems  · Constitutional  o Denies  o : fatigue, night sweats  · Eyes  o Denies  o : double vision, blurred vision  · HENT  o Denies  o : vertigo, recent head injury  · Cardiovascular  o Denies  o : chest pain, irregular heart beats  · Respiratory  o Denies  o : shortness of breath, productive  "cough  · Gastrointestinal  o Denies  o : nausea, vomiting  · Genitourinary  o Admits  o : frequency  o Denies  o : urgency, dysuria, urinary retention  · Integument  o Denies  o : hair growth change, new skin lesions  · Neurologic  o Denies  o : altered mental status, seizures  · Musculoskeletal  o Denies  o : joint swelling, limitation of motion  · Endocrine  o Denies  o : cold intolerance, heat intolerance  · Heme-Lymph  o Denies  o : petechiae, lymph node enlargement or tenderness  · Allergic-Immunologic  o Denies  o : frequent illnesses      Vitals  Date Time BP Position Site L\R Cuff Size HR RR TEMP (F) WT  HT  BMI kg/m2 BSA m2 O2 Sat FR L/min FiO2 HC       01/13/2021 09:29 /76 Sitting    68 - R  98.2 182lbs 0oz 5'  6\" 29.38 1.96 99 %            Physical Examination  · Constitutional  o Appearance  o : alert, in no acute distress  · Head and Face  o Head  o :   § Inspection  § : atraumatic, normocephalic  o Face  o :   § Inspection  § : no facial lesions  o HEENT  o : Unremarkable  · Eyes  o Conjunctivae  o : conjunctivae normal  o Sclerae  o : sclerae white  o Pupils and Irises  o : pupils equal and round, pupils reactive to light bilaterally  o Eyelids/Ocular Adnexae  o : eyelid appearance normal  · Neck  o Inspection/Palpation  o : normal appearance, no masses or tenderness, trachea midline  o Thyroid  o : gland size normal, nontender, no nodules or masses present on palpation  · Respiratory  o Respiratory Effort  o : breathing unlabored  o Auscultation of Lungs  o : normal breath sounds  · Cardiovascular  o Heart  o :   § Auscultation of Heart  § : regular rate, normal rhythm, no murmurs present  o Peripheral Vascular System  o :   § Extremities  § : no edema  · Gastrointestinal  o Abdominal Examination  o : abdomen nontender to palpation, normal bowel sounds, tone normal without rigidity or guarding, no masses present, abdominal obesity present, abdomen scaphoid upon supine  o Liver and spleen  o : " no hepatomegaly present  · Lymphatic  o Neck  o : no lymphadenopathy           Assessment  · Welcome to Medicare preventive visit     V70.0/Z00.00  · Screening for depression     V79.0/Z13.89  · Screening for alcoholism     V79.1/Z13.39  · Screening for colorectal cancer       Encounter for screening for malignant neoplasm of colon     V76.51/Z12.11  Encounter for screening for malignant neoplasm of rectum     V76.51/Z12.12  colonscopy 5-7 years ago in [facility]. He had polypectomy. Order colonoscopy referral  · Essential hypertension     401.9/I10  Monitor blood pressure at home. If blood pressure arnoldo elevated greater than 130/82 follow-up in office. Continue lisinopril 10 mg daily  · Hyperlipidemia     272.4/E78.5  · Screening for AAA (abdominal aortic aneurysm)     V81.2/Z13.6  Order abdominal aortic aneurysm ultrasound  · Tinnitus     388.30/H93.19  Consult ENT  · Hearing screen with abnormal findings     V72.19/Z01.118  · Former smoker     V15.82/Z87.891  quit 35 years ago      Plan  · Orders  o Falls Risk Assessment Completed (3288F) - V70.0/Z00.00 - 01/13/2021  o Brief hearing screening (written) [facility] () - V70.0/Z00.00 - 01/13/2021  o Welcome to Medicare Exam () - V70.0/Z00.00 - 01/13/2021  o Annual alcohol screening using the AUDIT-C tool, 15 minutes [facility] ([address], ) - V79.1/Z13.39 - 01/13/2021  o Negative alcohol screening () - V79.1/Z13.39 - 01/13/2021  o COLONOSCOPY REFERRAL (COLON) - V76.51/Z12.11, V76.51/Z12.12 - 01/13/2021  o Abdominal Aortic Aneurysm Medicare Screening U/S [facility]. ([address], ) - V81.2/Z13.6 - 01/13/2021  o ACO-39: Current medications updated and reviewed (, 1159F) - - 01/13/2021  o ACO-18: Negative screen for clinical depression using a standardized tool () - - 01/13/2021   2 points  o ACO-13: Fall Risk Screening with no falls in past year or only one fall without injury in the past year (1101F) - - 01/13/2021  o ACO - Pt declines to or was not able to provide  an Advance Care Plan or name a Surrogate Decision Maker (1124F) - - 01/13/2021  o ENT CONSULTATION (ENTCO) - 388.30/H93.19, V72.19/Z01.118 - 01/13/2021  · Medications  o Medications have been Reconciled  o Transition of Care or Provider Policy  · Instructions  o Written health screening schedule for next 5-10 years was established with patient; information scanned in chart and given to patient.  o Fall prevention methods discussed and a copy of recommendations given to patient.  o Depression Screen completed and scanned into the EMR under the designated folder within the patient's documents.  o Advised that cheeses and other sources of dairy fats, animal fats, fast food, and the extras (candy, pastries, pies, doughnuts and cookies) all contain LDL raising nutrients. Advised to increase fruits, vegetables, whole grains, and to monitor portion sizes.   o Patient was educated/instructed on their diagnosis, treatment and medications prior to discharge from the clinic today.  o Patient instructed to seek medical attention urgently for new or worsening symptoms.  o Call the office with any concerns or questions.  · Disposition  o Call or Return if symptoms worsen or persist.  o f/u 3 months            Electronically Signed by: ZULEYMA Ashley -Author on January 13, 2021 10:43:39 AM

## 2021-05-15 VITALS
WEIGHT: 170.25 LBS | HEART RATE: 58 BPM | OXYGEN SATURATION: 98 % | DIASTOLIC BLOOD PRESSURE: 58 MMHG | SYSTOLIC BLOOD PRESSURE: 106 MMHG | BODY MASS INDEX: 27.36 KG/M2 | TEMPERATURE: 97.5 F | HEIGHT: 66 IN

## 2021-05-15 VITALS — HEIGHT: 66 IN | RESPIRATION RATE: 12 BRPM | WEIGHT: 171.12 LBS | BODY MASS INDEX: 27.5 KG/M2

## 2021-07-12 PROBLEM — E78.5 HYPERLIPIDEMIA: Status: ACTIVE | Noted: 2021-07-12

## 2021-07-12 PROBLEM — I10 HYPERTENSION: Status: ACTIVE | Noted: 2021-07-12

## 2021-07-12 PROBLEM — H93.19 TINNITUS: Status: ACTIVE | Noted: 2021-07-12

## 2021-07-12 PROBLEM — M19.90 ARTHRITIS: Status: ACTIVE | Noted: 2021-07-12

## 2021-07-12 PROBLEM — H91.90 DEAFNESS: Status: ACTIVE | Noted: 2021-07-12

## 2021-07-12 NOTE — PROGRESS NOTES
"Chief Complaint  Hypertension    Subjective          Aristides Pineda presents to [facility]  History of Present Illness  Presents today for 3-month follow-up on hypertension, hyperlipidemia.  His blood pressure log is slightly elevated.  Average blood pressures between 130-160 over 70s.  He denies chest pain, syncope, palpitations.  He has some shortness of breath with exertion.  He reports having bilateral hand pain in the morning with stiffness.  Decreasing .  Denies redness and swelling.    Objective   Vital Signs:   /76   Pulse 60   Temp 97.2 °F (36.2 °C)   Ht 167.6 cm (66\")   Wt 81.7 kg (180 lb 3.2 oz)   SpO2 94%   BMI 29.09 kg/m²     Physical Exam  Vitals reviewed.   Constitutional:       Appearance: Normal appearance. He is well-developed.   HENT:      Head: Normocephalic and atraumatic.      Right Ear: External ear normal.      Left Ear: External ear normal.      Mouth/Throat:      Pharynx: No oropharyngeal exudate.   Eyes:      Conjunctiva/sclera: Conjunctivae normal.      Pupils: Pupils are equal, round, and reactive to light.   Cardiovascular:      Rate and Rhythm: Normal rate and regular rhythm.      Heart sounds: No murmur heard.   No friction rub. No gallop.    Pulmonary:      Effort: Pulmonary effort is normal.      Breath sounds: Normal breath sounds. No wheezing or rhonchi.   Abdominal:      General: Bowel sounds are normal. There is no distension.      Palpations: Abdomen is soft.      Tenderness: There is no abdominal tenderness.   Skin:     General: Skin is warm and dry.   Neurological:      Mental Status: He is alert and oriented to person, place, and time.      Cranial Nerves: No cranial nerve deficit.   Psychiatric:         Mood and Affect: Mood and affect normal.         Behavior: Behavior normal.         Thought Content: Thought content normal.         Judgment: Judgment normal.        Result Review :     Common labs    Common Labsle 11/19/20 " 4/14/21   Glucose  94   BUN  15   Creatinine  1.33 (A)   Sodium  141   Potassium  4.6   Chloride  105   Calcium  9.2   Albumin  4.3   Total Bilirubin  0.57   Alkaline Phosphatase  61   AST (SGOT)  31   ALT (SGPT)  26   Total Cholesterol  141   Triglycerides  183 (A)   HDL Cholesterol  48   LDL Cholesterol   56 (A)   PSA 3.80    (A) Abnormal value       Comments are available for some flowsheets but are not being displayed.                     Assessment and Plan    Diagnoses and all orders for this visit:    1. Essential hypertension (Primary)  Assessment & Plan:  Increase lisinopril to 20 mg daily.    Orders:  -     Comprehensive Metabolic Panel    2. Mixed hyperlipidemia  Assessment & Plan:  Cholesterol is well controlled.  Continue current regimen      3. Elevated serum creatinine  -     Comprehensive Metabolic Panel    4. Bilateral hand pain  Comments:  X-ray of right and left hand.  May take ibuprofen or four Aleve as needed for hand pain and stiffness.  Orders:  -     XR Hand 3+ View Right; Future  -     XR Hand 3+ View Left; Future    5. Prostate cancer screening  -     PSA SCREENING    6. Arthritis    Other orders  -     lisinopril (PRINIVIL,ZESTRIL) 20 MG tablet; Take 1 tablet by mouth Daily.  Dispense: 90 tablet; Refill: 1      Follow Up   No follow-ups on file.  Patient was given instructions and counseling regarding his condition or for health maintenance advice. Please see specific information pulled into the AVS if appropriate.

## 2021-07-13 ENCOUNTER — OFFICE VISIT (OUTPATIENT)
Dept: FAMILY MEDICINE CLINIC | Facility: CLINIC | Age: 76
End: 2021-07-13

## 2021-07-13 VITALS
SYSTOLIC BLOOD PRESSURE: 142 MMHG | TEMPERATURE: 97.2 F | WEIGHT: 180.2 LBS | HEIGHT: 66 IN | HEART RATE: 60 BPM | DIASTOLIC BLOOD PRESSURE: 76 MMHG | OXYGEN SATURATION: 94 % | BODY MASS INDEX: 28.96 KG/M2

## 2021-07-13 DIAGNOSIS — M79.641 BILATERAL HAND PAIN: ICD-10-CM

## 2021-07-13 DIAGNOSIS — M19.90 ARTHRITIS: ICD-10-CM

## 2021-07-13 DIAGNOSIS — I10 ESSENTIAL HYPERTENSION: Primary | ICD-10-CM

## 2021-07-13 DIAGNOSIS — M79.642 BILATERAL HAND PAIN: ICD-10-CM

## 2021-07-13 DIAGNOSIS — E78.2 MIXED HYPERLIPIDEMIA: ICD-10-CM

## 2021-07-13 DIAGNOSIS — Z12.5 PROSTATE CANCER SCREENING: ICD-10-CM

## 2021-07-13 DIAGNOSIS — R79.89 ELEVATED SERUM CREATININE: ICD-10-CM

## 2021-07-13 LAB
ALBUMIN SERPL-MCNC: 4.3 G/DL (ref 3.5–5.2)
ALBUMIN/GLOB SERPL: 1.9 G/DL
ALP SERPL-CCNC: 64 U/L (ref 39–117)
ALT SERPL W P-5'-P-CCNC: 27 U/L (ref 1–41)
ANION GAP SERPL CALCULATED.3IONS-SCNC: 9.2 MMOL/L (ref 5–15)
AST SERPL-CCNC: 38 U/L (ref 1–40)
BILIRUB SERPL-MCNC: 0.7 MG/DL (ref 0–1.2)
BUN SERPL-MCNC: 15 MG/DL (ref 8–23)
BUN/CREAT SERPL: 12 (ref 7–25)
CALCIUM SPEC-SCNC: 9 MG/DL (ref 8.6–10.5)
CHLORIDE SERPL-SCNC: 106 MMOL/L (ref 98–107)
CO2 SERPL-SCNC: 25.8 MMOL/L (ref 22–29)
CREAT SERPL-MCNC: 1.25 MG/DL (ref 0.76–1.27)
GFR SERPL CREATININE-BSD FRML MDRD: 56 ML/MIN/1.73
GLOBULIN UR ELPH-MCNC: 2.3 GM/DL
GLUCOSE SERPL-MCNC: 91 MG/DL (ref 65–99)
POTASSIUM SERPL-SCNC: 4.3 MMOL/L (ref 3.5–5.2)
PROT SERPL-MCNC: 6.6 G/DL (ref 6–8.5)
SODIUM SERPL-SCNC: 141 MMOL/L (ref 136–145)

## 2021-07-13 PROCEDURE — G0103 PSA SCREENING: HCPCS | Performed by: NURSE PRACTITIONER

## 2021-07-13 PROCEDURE — 99214 OFFICE O/P EST MOD 30 MIN: CPT | Performed by: NURSE PRACTITIONER

## 2021-07-13 PROCEDURE — 80053 COMPREHEN METABOLIC PANEL: CPT | Performed by: NURSE PRACTITIONER

## 2021-07-13 RX ORDER — LISINOPRIL 20 MG/1
20 TABLET ORAL DAILY
Qty: 90 TABLET | Refills: 1 | Status: SHIPPED | OUTPATIENT
Start: 2021-07-13 | End: 2021-10-12 | Stop reason: SINTOL

## 2021-07-13 RX ORDER — ASPIRIN 81 MG/1
81 TABLET ORAL DAILY
COMMUNITY

## 2021-07-13 RX ORDER — LISINOPRIL 10 MG/1
10 TABLET ORAL DAILY
COMMUNITY
Start: 2021-06-23 | End: 2021-07-13 | Stop reason: SDUPTHER

## 2021-07-13 RX ORDER — ROSUVASTATIN CALCIUM 5 MG/1
5 TABLET, COATED ORAL DAILY
COMMUNITY
Start: 2021-06-23 | End: 2021-10-13

## 2021-07-13 NOTE — PATIENT INSTRUCTIONS
Hypertension, Adult  Hypertension is another name for high blood pressure. High blood pressure forces your heart to work harder to pump blood. This can cause problems over time.  There are two numbers in a blood pressure reading. There is a top number (systolic) over a bottom number (diastolic). It is best to have a blood pressure that is below 120/80. Healthy choices can help lower your blood pressure, or you may need medicine to help lower it.  What are the causes?  The cause of this condition is not known. Some conditions may be related to high blood pressure.  What increases the risk?  · Smoking.  · Having type 2 diabetes mellitus, high cholesterol, or both.  · Not getting enough exercise or physical activity.  · Being overweight.  · Having too much fat, sugar, calories, or salt (sodium) in your diet.  · Drinking too much alcohol.  · Having long-term (chronic) kidney disease.  · Having a family history of high blood pressure.  · Age. Risk increases with age.  · Race. You may be at higher risk if you are .  · Gender. Men are at higher risk than women before age 45. After age 65, women are at higher risk than men.  · Having obstructive sleep apnea.  · Stress.  What are the signs or symptoms?  · High blood pressure may not cause symptoms. Very high blood pressure (hypertensive crisis) may cause:  ? Headache.  ? Feelings of worry or nervousness (anxiety).  ? Shortness of breath.  ? Nosebleed.  ? A feeling of being sick to your stomach (nausea).  ? Throwing up (vomiting).  ? Changes in how you see.  ? Very bad chest pain.  ? Seizures.  How is this treated?  · This condition is treated by making healthy lifestyle changes, such as:  ? Eating healthy foods.  ? Exercising more.  ? Drinking less alcohol.  · Your health care provider may prescribe medicine if lifestyle changes are not enough to get your blood pressure under control, and if:  ? Your top number is above 130.  ? Your bottom number is above  80.  · Your personal target blood pressure may vary.  Follow these instructions at home:  Eating and drinking    · If told, follow the DASH eating plan. To follow this plan:  ? Fill one half of your plate at each meal with fruits and vegetables.  ? Fill one fourth of your plate at each meal with whole grains. Whole grains include whole-wheat pasta, brown rice, and whole-grain bread.  ? Eat or drink low-fat dairy products, such as skim milk or low-fat yogurt.  ? Fill one fourth of your plate at each meal with low-fat (lean) proteins. Low-fat proteins include fish, chicken without skin, eggs, beans, and tofu.  ? Avoid fatty meat, cured and processed meat, or chicken with skin.  ? Avoid pre-made or processed food.  · Eat less than 1,500 mg of salt each day.  · Do not drink alcohol if:  ? Your doctor tells you not to drink.  ? You are pregnant, may be pregnant, or are planning to become pregnant.  · If you drink alcohol:  ? Limit how much you use to:  § 0-1 drink a day for women.  § 0-2 drinks a day for men.  ? Be aware of how much alcohol is in your drink. In the [place] one drink equals one 12 oz bottle of beer (355 mL), one 5 oz glass of wine (148 mL), or one 1½ oz glass of hard liquor (44 mL).  Lifestyle    · Work with your doctor to stay at a healthy weight or to lose weight. Ask your doctor what the best weight is for you.  · Get at least 30 minutes of exercise most days of the week. This may include walking, swimming, or biking.  · Get at least 30 minutes of exercise that strengthens your muscles (resistance exercise) at least 3 days a week. This may include lifting weights or doing Pilates.  · Do not use any products that contain nicotine or tobacco, such as cigarettes, e-cigarettes, and chewing tobacco. If you need help quitting, ask your doctor.  · Check your blood pressure at home as told by your doctor.  · Keep all follow-up visits as told by your doctor. This is important.  Medicines  · Take over-the-counter  and prescription medicines only as told by your doctor. Follow directions carefully.  · Do not skip doses of blood pressure medicine. The medicine does not work as well if you skip doses. Skipping doses also puts you at risk for problems.  · Ask your doctor about side effects or reactions to medicines that you should watch for.  Contact a doctor if you:  · Think you are having a reaction to the medicine you are taking.  · Have headaches that keep coming back (recurring).  · Feel dizzy.  · Have swelling in your ankles.  · Have trouble with your vision.  Get help right away if you:  · Get a very bad headache.  · Start to feel mixed up (confused).  · Feel weak or numb.  · Feel faint.  · Have very bad pain in your:  ? Chest.  ? Belly (abdomen).  · Throw up more than once.  · Have trouble breathing.  Summary  · Hypertension is another name for high blood pressure.  · High blood pressure forces your heart to work harder to pump blood.  · For most people, a normal blood pressure is less than 120/80.  · Making healthy choices can help lower blood pressure. If your blood pressure does not get lower with healthy choices, you may need to take medicine.  This information is not intended to replace advice given to you by your health care provider. Make sure you discuss any questions you have with your health care provider.  Document Revised: 08/28/2019 Document Reviewed: 08/28/2019  [employer] Patient Education © 2021 [employer]      Arthritis  Arthritis is a term that is commonly used to refer to joint pain or joint disease. There are more than 100 types of arthritis.  What are the causes?  The most common cause of this condition is wear and tear of a joint. Other causes include:  · Gout.  · Inflammation of a joint.  · An infection of a joint.  · Sprains and other injuries near the joint.  · A reaction to medicines or drugs, or an allergic reaction.  In some cases, the cause may not be known.  What are the signs or  symptoms?  The main symptom of this condition is pain in the joint during movement. Other symptoms include:  · Redness, swelling, or stiffness at a joint.  · Warmth coming from the joint.  · Fever.  · Overall feeling of illness.  How is this diagnosed?  This condition may be diagnosed with a physical exam and tests, including:  · Blood tests.  · Urine tests.  · Imaging tests, such as X-rays, an MRI, or a CT scan.  Sometimes, fluid is removed from a joint for testing.  How is this treated?  This condition may be treated with:  · Treatment of the cause, if it is known.  · Rest.  · Raising (elevating) the joint.  · Applying cold or hot packs to the joint.  · Medicines to improve symptoms and reduce inflammation.  · Injections of a steroid such as cortisone into the joint to help reduce pain and inflammation.  Depending on the cause of your arthritis, you may need to make lifestyle changes to reduce stress on your joint. Changes may include:  · Exercising more.  · Losing weight.  Follow these instructions at home:  Medicines  · Take over-the-counter and prescription medicines only as told by your health care provider.  · Do not take aspirin to relieve pain if your health care provider thinks that gout may be causing your pain.  Activity  · Rest your joint if told by your health care provider. Rest is important when your disease is active and your joint feels painful, swollen, or stiff.  · Avoid activities that make the pain worse. It is important to balance activity with rest.  · Exercise your joint regularly with range-of-motion exercises as told by your health care provider. Try doing low-impact exercise, such as:  ? Swimming.  ? Water aerobics.  ? Biking.  ? Walking.  Managing pain, stiffness, and swelling         · If directed, put ice on the joint.  ? Put ice in a plastic bag.  ? Place a towel between your skin and the bag.  ? Leave the ice on for 20 minutes, 2-3 times per day.  · If your joint is swollen, raise  (elevate) it above the level of your heart if directed by your health care provider.  · If your joint feels stiff in the morning, try taking a warm shower.  · If directed, apply heat to the affected area as often as told by your health care provider. Use the heat source that your health care provider recommends, such as a moist heat pack or a heating pad. If you have diabetes, do not apply heat without permission from your health care provider. To apply heat:  ? Place a towel between your skin and the heat source.  ? Leave the heat on for 20-30 minutes.  ? Remove the heat if your skin turns bright red. This is especially important if you are unable to feel pain, heat, or cold. You may have a greater risk of getting burned.  General instructions  · Do not use any products that contain nicotine or tobacco, such as cigarettes, e-cigarettes, and chewing tobacco. If you need help quitting, ask your health care provider.  · Keep all follow-up visits as told by your health care provider. This is important.  Contact a health care provider if:  · The pain gets worse.  · You have a fever.  Get help right away if:  · You develop severe joint pain, swelling, or redness.  · Many joints become painful and swollen.  · You develop severe back pain.  · You develop severe weakness in your leg.  · You cannot control your bladder or bowels.  Summary  · Arthritis is a term that is commonly used to refer to joint pain or joint disease. There are more than 100 types of arthritis.  · The most common cause of this condition is wear and tear of a joint. Other causes include gout, inflammation or infection of the joint, sprains, or allergies.  · Symptoms of this condition include redness, swelling, or stiffness of the joint. Other symptoms include warmth, fever, or feeling ill.  · This condition is treated with rest, elevation, medicines, and applying cold or hot packs.  · Follow your health care provider's instructions about medicines,  activity, exercises, and other home care treatments.  This information is not intended to replace advice given to you by your health care provider. Make sure you discuss any questions you have with your health care provider.  Document Revised: 11/25/2019 Document Reviewed: 11/25/2019  [employer] Patient Education © 2021 [employer]

## 2021-07-14 LAB — PSA SERPL-MCNC: 3.85 NG/ML (ref 0–4)

## 2021-07-23 ENCOUNTER — HOSPITAL ENCOUNTER (OUTPATIENT)
Dept: GENERAL RADIOLOGY | Facility: HOSPITAL | Age: 76
Discharge: HOME | End: 2021-07-23

## 2021-07-23 DIAGNOSIS — M79.642 BILATERAL HAND PAIN: ICD-10-CM

## 2021-07-23 DIAGNOSIS — M79.641 BILATERAL HAND PAIN: ICD-10-CM

## 2021-07-23 PROCEDURE — 73130 X-RAY EXAM OF HAND: CPT

## 2021-07-29 ENCOUNTER — TELEPHONE (OUTPATIENT)
Dept: FAMILY MEDICINE CLINIC | Facility: CLINIC | Age: 76
End: 2021-07-29

## 2021-07-29 NOTE — TELEPHONE ENCOUNTER
Caller: Aristides Pineda    Relationship: Self    Best call back number: [phone]    What is the best time to reach you: ANYTIME    Who are you requesting to speak with (clinical staff, provider,  specific staff member): KHADIJAH    Do you know the name of the person who called: KHADIJAH        Do you require a callback: YES

## 2021-10-12 ENCOUNTER — OFFICE VISIT (OUTPATIENT)
Dept: FAMILY MEDICINE CLINIC | Facility: CLINIC | Age: 76
End: 2021-10-12

## 2021-10-12 VITALS
WEIGHT: 178.2 LBS | OXYGEN SATURATION: 94 % | SYSTOLIC BLOOD PRESSURE: 142 MMHG | TEMPERATURE: 97.3 F | BODY MASS INDEX: 28.64 KG/M2 | HEART RATE: 53 BPM | DIASTOLIC BLOOD PRESSURE: 82 MMHG | HEIGHT: 66 IN

## 2021-10-12 DIAGNOSIS — E78.2 MIXED HYPERLIPIDEMIA: ICD-10-CM

## 2021-10-12 DIAGNOSIS — E55.9 VITAMIN D DEFICIENCY: ICD-10-CM

## 2021-10-12 DIAGNOSIS — R53.83 FATIGUE, UNSPECIFIED TYPE: ICD-10-CM

## 2021-10-12 DIAGNOSIS — I10 PRIMARY HYPERTENSION: Primary | ICD-10-CM

## 2021-10-12 LAB
25(OH)D3 SERPL-MCNC: 48.5 NG/ML (ref 30–100)
ALBUMIN SERPL-MCNC: 4.6 G/DL (ref 3.5–5.2)
ALBUMIN/GLOB SERPL: 2.1 G/DL
ALP SERPL-CCNC: 65 U/L (ref 39–117)
ALT SERPL W P-5'-P-CCNC: 24 U/L (ref 1–41)
ANION GAP SERPL CALCULATED.3IONS-SCNC: 10.6 MMOL/L (ref 5–15)
ANISOCYTOSIS BLD QL: NORMAL
AST SERPL-CCNC: 27 U/L (ref 1–40)
BILIRUB SERPL-MCNC: 0.5 MG/DL (ref 0–1.2)
BUN SERPL-MCNC: 13 MG/DL (ref 8–23)
BUN/CREAT SERPL: 10.1 (ref 7–25)
CALCIUM SPEC-SCNC: 9.3 MG/DL (ref 8.6–10.5)
CHLORIDE SERPL-SCNC: 105 MMOL/L (ref 98–107)
CO2 SERPL-SCNC: 25.4 MMOL/L (ref 22–29)
CREAT SERPL-MCNC: 1.29 MG/DL (ref 0.76–1.27)
DEPRECATED RDW RBC AUTO: 43.1 FL (ref 37–54)
EOSINOPHIL # BLD MANUAL: 0.13 10*3/MM3 (ref 0–0.4)
EOSINOPHIL NFR BLD MANUAL: 2 % (ref 0.3–6.2)
ERYTHROCYTE [DISTWIDTH] IN BLOOD BY AUTOMATED COUNT: 13.1 % (ref 12.3–15.4)
FOLATE SERPL-MCNC: >20 NG/ML (ref 4.78–24.2)
GFR SERPL CREATININE-BSD FRML MDRD: 54 ML/MIN/1.73
GLOBULIN UR ELPH-MCNC: 2.2 GM/DL
GLUCOSE SERPL-MCNC: 89 MG/DL (ref 65–99)
HCT VFR BLD AUTO: 45.9 % (ref 37.5–51)
HGB BLD-MCNC: 15.3 G/DL (ref 13–17.7)
LYMPHOCYTES # BLD MANUAL: 2.1 10*3/MM3 (ref 0.7–3.1)
LYMPHOCYTES NFR BLD MANUAL: 31.6 % (ref 19.6–45.3)
LYMPHOCYTES NFR BLD MANUAL: 8.2 % (ref 5–12)
MCH RBC QN AUTO: 30.2 PG (ref 26.6–33)
MCHC RBC AUTO-ENTMCNC: 33.3 G/DL (ref 31.5–35.7)
MCV RBC AUTO: 90.7 FL (ref 79–97)
MICROCYTES BLD QL: NORMAL
MONOCYTES # BLD AUTO: 0.53 10*3/MM3 (ref 0.1–0.9)
NEUTROPHILS # BLD AUTO: 3.68 10*3/MM3 (ref 1.7–7)
NEUTROPHILS NFR BLD MANUAL: 57.1 % (ref 42.7–76)
PLAT MORPH BLD: NORMAL
PLATELET # BLD AUTO: 200 10*3/MM3 (ref 140–450)
PMV BLD AUTO: 12.2 FL (ref 6–12)
POTASSIUM SERPL-SCNC: 4.6 MMOL/L (ref 3.5–5.2)
PROT SERPL-MCNC: 6.8 G/DL (ref 6–8.5)
RBC # BLD AUTO: 5.06 10*6/MM3 (ref 4.14–5.8)
SODIUM SERPL-SCNC: 141 MMOL/L (ref 136–145)
T-UPTAKE NFR SERPL: 1.13 TBI (ref 0.8–1.3)
T4 SERPL-MCNC: 7.58 MCG/DL (ref 4.5–11.7)
TSH SERPL DL<=0.05 MIU/L-ACNC: 1.86 UIU/ML (ref 0.27–4.2)
VARIANT LYMPHS NFR BLD MANUAL: 1 % (ref 0–5)
VIT B12 BLD-MCNC: 1326 PG/ML (ref 211–946)
WBC # BLD AUTO: 6.45 10*3/MM3 (ref 3.4–10.8)
WBC MORPH BLD: NORMAL

## 2021-10-12 PROCEDURE — 99214 OFFICE O/P EST MOD 30 MIN: CPT | Performed by: NURSE PRACTITIONER

## 2021-10-12 PROCEDURE — 80053 COMPREHEN METABOLIC PANEL: CPT | Performed by: NURSE PRACTITIONER

## 2021-10-12 PROCEDURE — 84443 ASSAY THYROID STIM HORMONE: CPT | Performed by: NURSE PRACTITIONER

## 2021-10-12 PROCEDURE — 82607 VITAMIN B-12: CPT | Performed by: NURSE PRACTITIONER

## 2021-10-12 PROCEDURE — 82746 ASSAY OF FOLIC ACID SERUM: CPT | Performed by: NURSE PRACTITIONER

## 2021-10-12 PROCEDURE — 84479 ASSAY OF THYROID (T3 OR T4): CPT | Performed by: NURSE PRACTITIONER

## 2021-10-12 PROCEDURE — 84436 ASSAY OF TOTAL THYROXINE: CPT | Performed by: NURSE PRACTITIONER

## 2021-10-12 PROCEDURE — 85027 COMPLETE CBC AUTOMATED: CPT | Performed by: NURSE PRACTITIONER

## 2021-10-12 PROCEDURE — 82306 VITAMIN D 25 HYDROXY: CPT | Performed by: NURSE PRACTITIONER

## 2021-10-12 PROCEDURE — 85007 BL SMEAR W/DIFF WBC COUNT: CPT | Performed by: NURSE PRACTITIONER

## 2021-10-12 RX ORDER — LOSARTAN POTASSIUM 50 MG/1
50 TABLET ORAL DAILY
Qty: 90 TABLET | Refills: 1 | Status: SHIPPED | OUTPATIENT
Start: 2021-10-12 | End: 2022-01-12 | Stop reason: SDUPTHER

## 2021-10-12 NOTE — PATIENT INSTRUCTIONS
Hypertension, Adult  Hypertension is another name for high blood pressure. High blood pressure forces your heart to work harder to pump blood. This can cause problems over time.  There are two numbers in a blood pressure reading. There is a top number (systolic) over a bottom number (diastolic). It is best to have a blood pressure that is below 120/80. Healthy choices can help lower your blood pressure, or you may need medicine to help lower it.  What are the causes?  The cause of this condition is not known. Some conditions may be related to high blood pressure.  What increases the risk?  · Smoking.  · Having type 2 diabetes mellitus, high cholesterol, or both.  · Not getting enough exercise or physical activity.  · Being overweight.  · Having too much fat, sugar, calories, or salt (sodium) in your diet.  · Drinking too much alcohol.  · Having long-term (chronic) kidney disease.  · Having a family history of high blood pressure.  · Age. Risk increases with age.  · Race. You may be at higher risk if you are .  · Gender. Men are at higher risk than women before age 45. After age 65, women are at higher risk than men.  · Having obstructive sleep apnea.  · Stress.  What are the signs or symptoms?  · High blood pressure may not cause symptoms. Very high blood pressure (hypertensive crisis) may cause:  ? Headache.  ? Feelings of worry or nervousness (anxiety).  ? Shortness of breath.  ? Nosebleed.  ? A feeling of being sick to your stomach (nausea).  ? Throwing up (vomiting).  ? Changes in how you see.  ? Very bad chest pain.  ? Seizures.  How is this treated?  · This condition is treated by making healthy lifestyle changes, such as:  ? Eating healthy foods.  ? Exercising more.  ? Drinking less alcohol.  · Your health care provider may prescribe medicine if lifestyle changes are not enough to get your blood pressure under control, and if:  ? Your top number is above 130.  ? Your bottom number is above  80.  · Your personal target blood pressure may vary.  Follow these instructions at home:  Eating and drinking    · If told, follow the DASH eating plan. To follow this plan:  ? Fill one half of your plate at each meal with fruits and vegetables.  ? Fill one fourth of your plate at each meal with whole grains. Whole grains include whole-wheat pasta, brown rice, and whole-grain bread.  ? Eat or drink low-fat dairy products, such as skim milk or low-fat yogurt.  ? Fill one fourth of your plate at each meal with low-fat (lean) proteins. Low-fat proteins include fish, chicken without skin, eggs, beans, and tofu.  ? Avoid fatty meat, cured and processed meat, or chicken with skin.  ? Avoid pre-made or processed food.  · Eat less than 1,500 mg of salt each day.  · Do not drink alcohol if:  ? Your doctor tells you not to drink.  ? You are pregnant, may be pregnant, or are planning to become pregnant.  · If you drink alcohol:  ? Limit how much you use to:  § 0-1 drink a day for women.  § 0-2 drinks a day for men.  ? Be aware of how much alcohol is in your drink. In the [place] one drink equals one 12 oz bottle of beer (355 mL), one 5 oz glass of wine (148 mL), or one 1½ oz glass of hard liquor (44 mL).    Lifestyle    · Work with your doctor to stay at a healthy weight or to lose weight. Ask your doctor what the best weight is for you.  · Get at least 30 minutes of exercise most days of the week. This may include walking, swimming, or biking.  · Get at least 30 minutes of exercise that strengthens your muscles (resistance exercise) at least 3 days a week. This may include lifting weights or doing Pilates.  · Do not use any products that contain nicotine or tobacco, such as cigarettes, e-cigarettes, and chewing tobacco. If you need help quitting, ask your doctor.  · Check your blood pressure at home as told by your doctor.  · Keep all follow-up visits as told by your doctor. This is important.    Medicines  · Take  over-the-counter and prescription medicines only as told by your doctor. Follow directions carefully.  · Do not skip doses of blood pressure medicine. The medicine does not work as well if you skip doses. Skipping doses also puts you at risk for problems.  · Ask your doctor about side effects or reactions to medicines that you should watch for.  Contact a doctor if you:  · Think you are having a reaction to the medicine you are taking.  · Have headaches that keep coming back (recurring).  · Feel dizzy.  · Have swelling in your ankles.  · Have trouble with your vision.  Get help right away if you:  · Get a very bad headache.  · Start to feel mixed up (confused).  · Feel weak or numb.  · Feel faint.  · Have very bad pain in your:  ? Chest.  ? Belly (abdomen).  · Throw up more than once.  · Have trouble breathing.  Summary  · Hypertension is another name for high blood pressure.  · High blood pressure forces your heart to work harder to pump blood.  · For most people, a normal blood pressure is less than 120/80.  · Making healthy choices can help lower blood pressure. If your blood pressure does not get lower with healthy choices, you may need to take medicine.  This information is not intended to replace advice given to you by your health care provider. Make sure you discuss any questions you have with your health care provider.  Document Revised: 08/28/2019 Document Reviewed: 08/28/2019  [employer] Patient Education © 2021 [employer]      High Cholesterol    High cholesterol is a condition in which the blood has high levels of a white, waxy substance similar to fat (cholesterol). The liver makes all the cholesterol that the body needs. The human body needs small amounts of cholesterol to help build cells. A person gets extra or excess cholesterol from the food that he or she eats.  The blood carries cholesterol from the liver to the rest of the body. If you have high cholesterol, deposits (plaques) may build up on  "the walls of your arteries. Arteries are the blood vessels that carry blood away from your heart. These plaques make the arteries narrow and stiff.  Cholesterol plaques increase your risk for heart attack and stroke. Work with your health care provider to keep your cholesterol levels in a healthy range.  What increases the risk?  The following factors may make you more likely to develop this condition:  · Eating foods that are high in animal fat (saturated fat) or cholesterol.  · Being overweight.  · Not getting enough exercise.  · A family history of high cholesterol (familial hypercholesterolemia).  · Use of tobacco products.  · Having diabetes.  What are the signs or symptoms?  There are no symptoms of this condition.  How is this diagnosed?  This condition may be diagnosed based on the results of a blood test.  · If you are older than 20 years of age, your health care provider may check your cholesterol levels every 4-6 years.  · You may be checked more often if you have high cholesterol or other risk factors for heart disease.  The blood test for cholesterol measures:  · \"Bad\" cholesterol, or LDL cholesterol. This is the main type of cholesterol that causes heart disease. The desired level is less than 100 mg/dL.  · \"Good\" cholesterol, or HDL cholesterol. HDL helps protect against heart disease by cleaning the arteries and carrying the LDL to the liver for processing. The desired level for HDL is 60 mg/dL or higher.  · Triglycerides. These are fats that your body can store or burn for energy. The desired level is less than 150 mg/dL.  · Total cholesterol. This measures the total amount of cholesterol in your blood and includes LDL, HDL, and triglycerides. The desired level is less than 200 mg/dL.  How is this treated?  This condition may be treated with:  · Diet changes. You may be asked to eat foods that have more fiber and less saturated fats or added sugar.  · Lifestyle changes. These may include regular " exercise, maintaining a healthy weight, and quitting use of tobacco products.  · Medicines. These are given when diet and lifestyle changes have not worked. You may be prescribed a statin medicine to help lower your cholesterol levels.  Follow these instructions at home:  Eating and drinking    · Eat a healthy, balanced diet. This diet includes:  ? Daily servings of a variety of fresh, frozen, or canned fruits and vegetables.  ? Daily servings of whole grain foods that are rich in fiber.  ? Foods that are low in saturated fats and trans fats. These include poultry and fish without skin, lean cuts of meat, and low-fat dairy products.  ? A variety of fish, especially oily fish that contain omega-3 fatty acids. Aim to eat fish at least 2 times a week.  · Avoid foods and drinks that have added sugar.  · Use healthy cooking methods, such as roasting, grilling, broiling, baking, poaching, steaming, and stir-frying. Do not villeda your food except for stir-frying.    Lifestyle    · Get regular exercise. Aim to exercise for a total of 150 minutes a week. Increase your activity level by doing activities such as gardening, walking, and taking the stairs.  · Do not use any products that contain nicotine or tobacco, such as cigarettes, e-cigarettes, and chewing tobacco. If you need help quitting, ask your health care provider.    General instructions  · Take over-the-counter and prescription medicines only as told by your health care provider.  · Keep all follow-up visits as told by your health care provider. This is important.  Where to find more information  · American Heart Association: www.heart.org  · National Heart, Lung, and Blood [place]: www.nhlbi.nih.gov  Contact a health care provider if:  · You have trouble achieving or maintaining a healthy diet or weight.  · You are starting an exercise program.  · You are unable to stop smoking.  Get help right away if:  · You have chest pain.  · You have trouble breathing.  · You  "have any symptoms of a stroke. \"BE FAST\" is an easy way to remember the main warning signs of a stroke:  ? B - Balance. Signs are dizziness, sudden trouble walking, or loss of balance.  ? E - Eyes. Signs are trouble seeing or a sudden change in vision.  ? F - Face. Signs are sudden weakness or numbness of the face, or the face or eyelid drooping on one side.  ? A - Arms. Signs are weakness or numbness in an arm. This happens suddenly and usually on one side of the body.  ? S - Speech. Signs are sudden trouble speaking, slurred speech, or trouble understanding what people say.  ? T - Time. Time to call emergency services. Write down what time symptoms started.  · You have other signs of a stroke, such as:  ? A sudden, severe headache with no known cause.  ? Nausea or vomiting.  ? Seizure.  These symptoms may represent a serious problem that is an emergency. Do not wait to see if the symptoms will go away. Get medical help right away. Call your local emergency services (911 in the [place] Do not drive yourself to the hospital.  Summary  · Cholesterol plaques increase your risk for heart attack and stroke. Work with your health care provider to keep your cholesterol levels in a healthy range.  · Eat a healthy, balanced diet, get regular exercise, and maintain a healthy weight.  · Do not use any products that contain nicotine or tobacco, such as cigarettes, e-cigarettes, and chewing tobacco.  · Get help right away if you have any symptoms of a stroke.  This information is not intended to replace advice given to you by your health care provider. Make sure you discuss any questions you have with your health care provider.  Document Revised: 11/16/2020 Document Reviewed: 11/16/2020  [employer] Patient Education © 2021 [employer]      "

## 2021-10-12 NOTE — PROGRESS NOTES
Chief Complaint  Follow-up and Hypertension    Subjective          Aristides Pineda presents to [facility]  Hypertension  This is a chronic problem. The problem has been gradually improving since onset. The problem is controlled. Associated symptoms include malaise/fatigue and shortness of breath. Pertinent negatives include no anxiety, blurred vision, chest pain, headaches, neck pain, orthopnea, palpitations, peripheral edema, PND or sweats. (Shortness of breath with exertion) Risk factors for coronary artery disease include male gender (former smoker). Past treatments include ACE inhibitors. Current antihypertension treatment includes ACE inhibitors. The current treatment provides moderate improvement. There are no compliance problems.  There is no history of chronic renal disease.   Hyperlipidemia  This is a chronic problem. The current episode started more than 1 year ago. The problem is controlled. Recent lipid tests were reviewed and are normal. He has no history of chronic renal disease, diabetes, hypothyroidism, liver disease, obesity or nephrotic syndrome. Associated symptoms include shortness of breath. Pertinent negatives include no chest pain or myalgias. Current antihyperlipidemic treatment includes statins and herbal therapy. The current treatment provides significant improvement of lipids. There are no compliance problems.  Risk factors for coronary artery disease include hypertension, male sex and dyslipidemia.   Fatigue  This is a new problem. The current episode started more than 1 month ago. The problem occurs every several days. The problem has been waxing and waning. Associated symptoms include fatigue. Pertinent negatives include no abdominal pain, anorexia, arthralgias, change in bowel habit, chest pain, chills, congestion, coughing, diaphoresis, fever, headaches, joint swelling, myalgias, nausea, neck pain, numbness, rash, sore throat, swollen glands, urinary  "symptoms, vertigo, visual change, vomiting or weakness. Nothing aggravates the symptoms. He has tried sleep and rest for the symptoms. The treatment provided mild relief.   Developed a dry cough in the last 1-2 months.   Social History     Socioeconomic History   • Marital status:    Tobacco Use   • Smoking status: Former Smoker     Packs/day: 1.00     Years: 8.00     Pack years: 8.00     Quit date: 1970     Years since quittin.8   • Smokeless tobacco: Never Used   • Tobacco comment: quit 40 yrs ago       Objective   Vital Signs:   /82   Pulse 53   Temp 97.3 °F (36.3 °C)   Ht 167.6 cm (66\")   Wt 80.8 kg (178 lb 3.2 oz)   SpO2 94%   BMI 28.76 kg/m²     Physical Exam  Vitals reviewed.   Constitutional:       Appearance: Normal appearance. He is well-developed.   HENT:      Head: Normocephalic and atraumatic.      Right Ear: External ear normal.      Left Ear: External ear normal.      Mouth/Throat:      Pharynx: No oropharyngeal exudate.   Eyes:      Conjunctiva/sclera: Conjunctivae normal.      Pupils: Pupils are equal, round, and reactive to light.   Cardiovascular:      Rate and Rhythm: Normal rate and regular rhythm.      Heart sounds: No murmur heard.  No friction rub. No gallop.    Pulmonary:      Effort: Pulmonary effort is normal.      Breath sounds: Normal breath sounds. No wheezing or rhonchi.   Abdominal:      General: Bowel sounds are normal. There is no distension.      Palpations: Abdomen is soft.      Tenderness: There is no abdominal tenderness.   Skin:     General: Skin is warm and dry.   Neurological:      Mental Status: He is alert and oriented to person, place, and time.   Psychiatric:         Mood and Affect: Mood and affect normal.         Behavior: Behavior normal.         Thought Content: Thought content normal.         Judgment: Judgment normal.        Result Review :                 Assessment and Plan    Diagnoses and all orders for this visit:    1. Primary " hypertension (Primary)  Assessment & Plan:  Switch lisinopril 20 mg daily to losartan 50 mg daily.    Orders:  -     losartan (COZAAR) 50 MG tablet; Take 1 tablet by mouth Daily.  Dispense: 90 tablet; Refill: 1  -     CBC With Manual Differential  -     Comprehensive Metabolic Panel  -     Thyroid Panel With TSH    2. Mixed hyperlipidemia  Assessment & Plan:  Lipid abnormalities are Well-controlled.  Pharmacotherapy as ordered.  Lipids will be reassessed in 3 months.      3. Fatigue, unspecified type  -     Vitamin B12 & Folate    4. Vitamin D deficiency  -     Vitamin D 25 Hydroxy      Follow Up   Return in about 3 months (around 1/12/2022), or if symptoms worsen or fail to improve, for Next scheduled follow up.  Patient was given instructions and counseling regarding his condition or for health maintenance advice. Please see specific information pulled into the AVS if appropriate.

## 2021-10-12 NOTE — ASSESSMENT & PLAN NOTE
Lipid abnormalities are Well-controlled.  Pharmacotherapy as ordered.  Lipids will be reassessed in 3 months.

## 2021-10-13 RX ORDER — ROSUVASTATIN CALCIUM 5 MG/1
TABLET, COATED ORAL
Qty: 90 TABLET | Refills: 1 | Status: SHIPPED | OUTPATIENT
Start: 2021-10-13 | End: 2022-06-13 | Stop reason: SDUPTHER

## 2021-10-18 ENCOUNTER — TELEPHONE (OUTPATIENT)
Dept: FAMILY MEDICINE CLINIC | Facility: CLINIC | Age: 76
End: 2021-10-18

## 2021-10-18 NOTE — TELEPHONE ENCOUNTER
Caller: Aristides Pineda    Relationship to patient: Self    Best call back number: [phone]     Patient is needing: PT CALLED TO GET HIS LAB RESULTS, PLEASE ADVISE, THANK YOU.    UNABLE TO WARM TRANSFER

## 2021-10-18 NOTE — TELEPHONE ENCOUNTER
Rusty for pt to return call, also stated I sent him a letter in [employer] for him to look at them there

## 2021-10-28 ENCOUNTER — TELEPHONE (OUTPATIENT)
Dept: FAMILY MEDICINE CLINIC | Facility: CLINIC | Age: 76
End: 2021-10-28

## 2021-10-28 RX ORDER — HYDROCHLOROTHIAZIDE 12.5 MG/1
12.5 TABLET ORAL DAILY
Qty: 30 TABLET | Refills: 2 | Status: SHIPPED | OUTPATIENT
Start: 2021-10-28 | End: 2021-12-28 | Stop reason: SDUPTHER

## 2021-10-28 NOTE — TELEPHONE ENCOUNTER
Caller: Aristides Pineda    Relationship to patient: Self    Best call back number: [phone]    Patient is needing: PT CALLED TO SAY THE BP MEDS WHEN ADJUSTED HAS RESULTED IN HIS BP CONSISTENTLY BEING HIGHLY.  PLEASE CALL BACK AND ADVISE, THANK YOU.

## 2021-12-27 ENCOUNTER — TELEPHONE (OUTPATIENT)
Dept: FAMILY MEDICINE CLINIC | Facility: CLINIC | Age: 76
End: 2021-12-27

## 2021-12-27 NOTE — TELEPHONE ENCOUNTER
Migue from [facility] called to check on status of refill from December 20th - Hydrochlorothiazide.

## 2021-12-28 RX ORDER — HYDROCHLOROTHIAZIDE 12.5 MG/1
12.5 TABLET ORAL DAILY
Qty: 90 TABLET | Refills: 1 | Status: SHIPPED | OUTPATIENT
Start: 2021-12-28 | End: 2022-01-12

## 2022-01-12 ENCOUNTER — OFFICE VISIT (OUTPATIENT)
Dept: FAMILY MEDICINE CLINIC | Facility: CLINIC | Age: 77
End: 2022-01-12

## 2022-01-12 VITALS
HEIGHT: 66 IN | OXYGEN SATURATION: 97 % | SYSTOLIC BLOOD PRESSURE: 144 MMHG | DIASTOLIC BLOOD PRESSURE: 80 MMHG | HEART RATE: 55 BPM | BODY MASS INDEX: 29.18 KG/M2 | WEIGHT: 181.6 LBS | TEMPERATURE: 97.6 F

## 2022-01-12 DIAGNOSIS — R31.29 MICROHEMATURIA: ICD-10-CM

## 2022-01-12 DIAGNOSIS — Z23 NEED FOR INFLUENZA VACCINATION: ICD-10-CM

## 2022-01-12 DIAGNOSIS — E78.2 MIXED HYPERLIPIDEMIA: ICD-10-CM

## 2022-01-12 DIAGNOSIS — R39.11 URINARY HESITANCY: ICD-10-CM

## 2022-01-12 DIAGNOSIS — Z23 NEED FOR PNEUMOCOCCAL VACCINATION: ICD-10-CM

## 2022-01-12 DIAGNOSIS — I10 PRIMARY HYPERTENSION: Primary | ICD-10-CM

## 2022-01-12 LAB
BILIRUB BLD-MCNC: NEGATIVE MG/DL
CLARITY, POC: CLEAR
COLOR UR: YELLOW
GLUCOSE UR STRIP-MCNC: NEGATIVE MG/DL
KETONES UR QL: NEGATIVE
LEUKOCYTE EST, POC: NEGATIVE
NITRITE UR-MCNC: NEGATIVE MG/ML
PH UR: 6 [PH] (ref 5–8)
PROT UR STRIP-MCNC: NEGATIVE MG/DL
RBC # UR STRIP: ABNORMAL /UL
SP GR UR: 1.01 (ref 1–1.03)
UROBILINOGEN UR QL: NORMAL

## 2022-01-12 PROCEDURE — G0009 ADMIN PNEUMOCOCCAL VACCINE: HCPCS | Performed by: NURSE PRACTITIONER

## 2022-01-12 PROCEDURE — 81002 URINALYSIS NONAUTO W/O SCOPE: CPT | Performed by: NURSE PRACTITIONER

## 2022-01-12 PROCEDURE — 87086 URINE CULTURE/COLONY COUNT: CPT | Performed by: NURSE PRACTITIONER

## 2022-01-12 PROCEDURE — 99214 OFFICE O/P EST MOD 30 MIN: CPT | Performed by: NURSE PRACTITIONER

## 2022-01-12 PROCEDURE — 90670 PCV13 VACCINE IM: CPT | Performed by: NURSE PRACTITIONER

## 2022-01-12 RX ORDER — LOSARTAN POTASSIUM 50 MG/1
100 TABLET ORAL DAILY
Qty: 180 TABLET | Refills: 1 | Status: SHIPPED | OUTPATIENT
Start: 2022-01-12 | End: 2022-07-08

## 2022-01-12 NOTE — PROGRESS NOTES
Answers for HPI/ROS submitted by the patient on 2022  What is the primary reason for your visit?: High Blood Pressure  Chronicity: chronic  Onset: more than 1 year ago  Progression since onset: waxing and waning  Condition status: resistant  Agents associated with hypertension: no associated agents  CAD risks: obesity  Compliance problems: no compliance problems    Chief Complaint  Hypertension    Subjective          Aristides Pineda presents to [facility]   Presents today for 3-month follow-up on hypertension, hyperlipidemia, and fatigue  Hypertension  This is a chronic problem. The current episode started more than 1 year ago. The problem has been waxing and waning since onset. The problem is resistant. Pertinent negatives include no anxiety, blurred vision, chest pain, headaches, malaise/fatigue, neck pain, orthopnea, palpitations, peripheral edema, PND or sweats. (Shortness of breath with exertion) There are no associated agents to hypertension. Risk factors for coronary artery disease include obesity and male gender. Current antihypertension treatment includes diuretics and angiotensin blockers. The current treatment provides moderate improvement. There are no compliance problems.      He reports his fatigue has improved.  His cholesterol is well controlled at this time.  Since switching from lisinopril to losartan he is cough has resolved.  He does report since being on hydrochlorothiazide he has been having more urinary symptoms including frequency and urgency.  He also reports having urinary hesitancy.  He reports his blood pressures at home are 130/70.  Blood pressure today slightly elevated 144/80.  He denies chest pain, syncope, palpitations    Social History     Socioeconomic History   • Marital status:    Tobacco Use   • Smoking status: Former Smoker     Packs/day: 1.00     Years: 8.00     Pack years: 8.00     Quit date:      Years since quittin.0  "  • Smokeless tobacco: Never Used   • Tobacco comment: quit 40 yrs ago       Objective   Vital Signs:   /80 (BP Location: Left arm, Patient Position: Sitting, Cuff Size: Adult)   Pulse 55   Temp 97.6 °F (36.4 °C)   Ht 167.6 cm (66\")   Wt 82.4 kg (181 lb 9.6 oz)   SpO2 97%   BMI 29.31 kg/m²     Physical Exam  Vitals reviewed.   Constitutional:       Appearance: Normal appearance. He is well-developed.   HENT:      Head: Normocephalic and atraumatic.      Right Ear: External ear normal.      Left Ear: External ear normal.      Nose: Nose normal.      Mouth/Throat:      Pharynx: No oropharyngeal exudate.   Eyes:      Conjunctiva/sclera: Conjunctivae normal.      Pupils: Pupils are equal, round, and reactive to light.   Cardiovascular:      Rate and Rhythm: Normal rate and regular rhythm.      Heart sounds: No murmur heard.  No friction rub. No gallop.    Pulmonary:      Effort: Pulmonary effort is normal.      Breath sounds: Normal breath sounds. No wheezing or rhonchi.   Abdominal:      General: Bowel sounds are normal. There is no distension.      Palpations: Abdomen is soft.      Tenderness: There is no abdominal tenderness.   Musculoskeletal:      Cervical back: Normal range of motion and neck supple. No tenderness.   Skin:     General: Skin is warm and dry.   Neurological:      Mental Status: He is alert and oriented to person, place, and time.   Psychiatric:         Mood and Affect: Mood and affect normal.         Behavior: Behavior normal.         Thought Content: Thought content normal.         Judgment: Judgment normal.        Result Review :     Common labs    Common Labsle 4/14/21 7/13/21 7/13/21 10/12/21 10/12/21     0844 [id number]   Glucose 94 91   89   BUN 15 15   13   Creatinine 1.33 (A) 1.25   1.29 (A)   eGFR Non African Am  56 (A)   54 (A)   Sodium 141 141   141   Potassium 4.6 4.3   4.6   Chloride 105 106   105   Calcium 9.2 9.0   9.3   Albumin 4.3 4.30   4.60   Total Bilirubin 0.57 " 0.7   0.5   Alkaline Phosphatase 61 64   65   AST (SGOT) 31 38   27   ALT (SGPT) 26 27   24   WBC    6.45    Hemoglobin    15.3    Hematocrit    45.9    Platelets    200    Total Cholesterol 141       Triglycerides 183 (A)       HDL Cholesterol 48       LDL Cholesterol  56 (A)       PSA   3.850     (A) Abnormal value       Comments are available for some flowsheets but are not being displayed.           Brief Urine Lab Results  (Last result in the past 365 days)      Color   Clarity   Blood   Leuk Est   Nitrite   Protein   CREAT   Urine HCG        01/12/22 0750 Yellow   Clear   Trace   Negative   Negative   Negative                           Assessment and Plan    Diagnoses and all orders for this visit:    1. Primary hypertension (Primary)  Assessment & Plan:  Due to the urinary frequency and urgency since starting hydrochlorothiazide, we will discontinue HCTZ.  We will increase losartan to 100 mg daily.    Orders:  -     losartan (COZAAR) 50 MG tablet; Take 2 tablets by mouth Daily.  Dispense: 180 tablet; Refill: 1    2. Mixed hyperlipidemia  Assessment & Plan:  Cholesterol is well controlled.  Continue current regimen.  Recheck cholesterol in 3 months      3. Urinary hesitancy  Comments:  Check urinalysis.  If symptoms continue after stopping HCTZ will consider starting Flomax.  PSA normal limits  Orders:  -     POCT urinalysis dipstick, manual  -     Urine Culture - Urine, Urine, Clean Catch    4. Microhematuria  Comments:  Send urine for culture and sensitivity    5. Need for influenza vaccination  Comments:  declines    6. Need for pneumococcal vaccination  -     pneumococcal conj. 13-valent (PREVNAR-13) vaccine 0.5 mL      Follow Up   Return in about 3 months (around 4/12/2022), or if symptoms worsen or fail to improve, for Next scheduled follow up.  Patient was given instructions and counseling regarding his condition or for health maintenance advice. Please see specific information pulled into the AVS if  appropriate.

## 2022-01-12 NOTE — ASSESSMENT & PLAN NOTE
Due to the urinary frequency and urgency since starting hydrochlorothiazide, we will discontinue HCTZ.  We will increase losartan to 100 mg daily.

## 2022-01-13 LAB — BACTERIA SPEC AEROBE CULT: NO GROWTH

## 2022-02-01 RX ORDER — HYDROCHLOROTHIAZIDE 12.5 MG/1
12.5 TABLET ORAL DAILY
Qty: 30 TABLET | Refills: 2 | OUTPATIENT
Start: 2022-02-01

## 2022-04-14 ENCOUNTER — OFFICE VISIT (OUTPATIENT)
Dept: FAMILY MEDICINE CLINIC | Facility: CLINIC | Age: 77
End: 2022-04-14

## 2022-04-14 VITALS
TEMPERATURE: 96.7 F | SYSTOLIC BLOOD PRESSURE: 122 MMHG | OXYGEN SATURATION: 97 % | WEIGHT: 171.2 LBS | HEIGHT: 66 IN | DIASTOLIC BLOOD PRESSURE: 68 MMHG | BODY MASS INDEX: 27.51 KG/M2 | HEART RATE: 49 BPM

## 2022-04-14 DIAGNOSIS — H91.90 DEAFNESS, UNSPECIFIED LATERALITY: ICD-10-CM

## 2022-04-14 DIAGNOSIS — E78.2 MIXED HYPERLIPIDEMIA: ICD-10-CM

## 2022-04-14 DIAGNOSIS — Z00.00 MEDICARE ANNUAL WELLNESS VISIT, SUBSEQUENT: Primary | ICD-10-CM

## 2022-04-14 DIAGNOSIS — I10 PRIMARY HYPERTENSION: ICD-10-CM

## 2022-04-14 PROCEDURE — 80053 COMPREHEN METABOLIC PANEL: CPT | Performed by: NURSE PRACTITIONER

## 2022-04-14 PROCEDURE — 1160F RVW MEDS BY RX/DR IN RCRD: CPT | Performed by: NURSE PRACTITIONER

## 2022-04-14 PROCEDURE — 1170F FXNL STATUS ASSESSED: CPT | Performed by: NURSE PRACTITIONER

## 2022-04-14 PROCEDURE — G0439 PPPS, SUBSEQ VISIT: HCPCS | Performed by: NURSE PRACTITIONER

## 2022-04-14 PROCEDURE — 96160 PT-FOCUSED HLTH RISK ASSMT: CPT | Performed by: NURSE PRACTITIONER

## 2022-04-14 PROCEDURE — 84443 ASSAY THYROID STIM HORMONE: CPT | Performed by: NURSE PRACTITIONER

## 2022-04-14 PROCEDURE — 80061 LIPID PANEL: CPT | Performed by: NURSE PRACTITIONER

## 2022-04-14 PROCEDURE — 85025 COMPLETE CBC W/AUTO DIFF WBC: CPT | Performed by: NURSE PRACTITIONER

## 2022-04-14 NOTE — ASSESSMENT & PLAN NOTE
Hypertension is Well-controlled.  Continue current treatment regimen.  Blood pressure will be reassessed 6 months.

## 2022-04-14 NOTE — PROGRESS NOTES
The ABCs of the Annual Wellness Visit  Subsequent Medicare Wellness Visit    Chief Complaint   Patient presents with   • Medicare Wellness-subsequent      Subjective    History of Present Illness:  Aristides Pineda is a 76 y.o. male who presents for a Subsequent Medicare Wellness Visit.    The following portions of the patient's history were reviewed and   updated as appropriate: allergies, current medications, past family history, past medical history, past social history, past surgical history and problem list.    Compared to one year ago, the patient feels his physical   health is the same.    Compared to one year ago, the patient feels his mental   health is the same.    Recent Hospitalizations:  He was not admitted to the hospital during the last year.       Current Medical Providers:  Patient Care Team:  James Love APRN as PCP - General (Nurse Practitioner)    Outpatient Medications Prior to Visit   Medication Sig Dispense Refill   • aspirin 81 MG EC tablet Take 81 mg by mouth Daily.     • losartan (COZAAR) 50 MG tablet Take 2 tablets by mouth Daily. 180 tablet 1   • Omega 3-6-9 Fatty Acids (OMEGA 3-6-9 COMPLEX PO) Fish Oil 1,000 mg (120 mg-180 mg) oral capsule take 1 capsule by oral route once a day (in the morning)   Active     • rosuvastatin (CRESTOR) 5 MG tablet TAKE 1 TABLET ONE TIME DAILY AT BEDTIME 90 tablet 1     No facility-administered medications prior to visit.       No opioid medication identified on active medication list. I have reviewed chart for other potential  high risk medication/s and harmful drug interactions in the elderly.          Aspirin is on active medication list. Aspirin use is indicated based on review of current medical condition/s. Pros and cons of this therapy have been discussed today. Benefits of this medication outweigh potential harm.  Patient has been encouraged to continue taking this medication.  .      Patient Active Problem List   Diagnosis   • Arthritis   •  "Deafness   • Hyperlipidemia   • Hypertension   • Tinnitus     Advance Care Planning  Advance Directive is not on file.  ACP discussion was held with the patient during this visit. Patient does not have an advance directive, information provided.          Objective    Vitals:    04/14/22 0947   BP: 122/68   Pulse: (!) 49   Temp: 96.7 °F (35.9 °C)   SpO2: 97%   Weight: 77.7 kg (171 lb 3.2 oz)   Height: 167.6 cm (66\")     BMI Readings from Last 1 Encounters:   04/14/22 27.63 kg/m²   BMI is above normal parameters. Recommendations include: exercise counseling and nutrition counseling    Does the patient have evidence of cognitive impairment? No    Physical Exam  Vitals reviewed.   Constitutional:       General: He is not in acute distress.     Appearance: Normal appearance. He is not diaphoretic.   HENT:      Head: Normocephalic and atraumatic. Hair is normal.      Right Ear: Hearing, tympanic membrane, ear canal and external ear normal.      Left Ear: Hearing, tympanic membrane, ear canal and external ear normal.      Nose: Nose normal. No nasal deformity.      Mouth/Throat:      Mouth: Mucous membranes are moist. No oral lesions.      Pharynx: Uvula midline. No uvula swelling.   Eyes:      General: Lids are normal. No scleral icterus.        Right eye: No discharge.         Left eye: No discharge.      Extraocular Movements: Extraocular movements intact.      Right eye: Normal extraocular motion and no nystagmus.      Left eye: Normal extraocular motion and no nystagmus.      Conjunctiva/sclera: Conjunctivae normal.      Pupils: Pupils are equal, round, and reactive to light.   Neck:      Thyroid: No thyromegaly.      Vascular: No JVD.   Cardiovascular:      Rate and Rhythm: Normal rate and regular rhythm.      Pulses: Normal pulses.      Heart sounds: Normal heart sounds. No murmur heard.    No gallop.   Pulmonary:      Effort: Pulmonary effort is normal. No respiratory distress.      Breath sounds: Normal breath " sounds. No wheezing or rales.   Chest:      Chest wall: No tenderness.   Abdominal:      General: Bowel sounds are normal. There is no distension.      Palpations: Abdomen is soft. There is no mass.      Tenderness: There is no abdominal tenderness. There is no guarding.      Hernia: No hernia is present.   Musculoskeletal:         General: No tenderness or deformity. Normal range of motion.      Cervical back: Normal range of motion and neck supple.   Lymphadenopathy:      Cervical: No cervical adenopathy.   Skin:     General: Skin is warm and dry.      Findings: No rash.   Neurological:      Mental Status: He is alert and oriented to person, place, and time.      Deep Tendon Reflexes: Reflexes are normal and symmetric.   Psychiatric:         Mood and Affect: Mood normal.         Behavior: Behavior normal.         Thought Content: Thought content normal.         Judgment: Judgment normal.                 HEALTH RISK ASSESSMENT    Smoking Status:  Social History     Tobacco Use   Smoking Status Former Smoker   • Packs/day: 1.00   • Years: 8.00   • Pack years: 8.00   • Quit date:    • Years since quittin.3   Smokeless Tobacco Never Used   Tobacco Comment    quit 40 yrs ago     Alcohol Consumption:  Social History     Substance and Sexual Activity   Alcohol Use None     Fall Risk Screen:    [facility] Fall Risk Assessment has not been completed.    Depression Screening:  PHQ-2/PHQ-9 Depression Screening 2021   Retired PHQ-9 Total Score 0   Retired Total Score 0       Health Habits and Functional and Cognitive Screening:  Functional & Cognitive Status 2022   Do you have difficulty preparing food and eating? No   Do you have difficulty bathing yourself, getting dressed or grooming yourself? No   Do you have difficulty using the toilet? No   Do you have difficulty moving around from place to place? No   Do you have trouble with steps or getting out of a bed or a chair? No   Current Diet Well Balanced Diet    Dental Exam Not up to date   Eye Exam Not up to date   Exercise (times per week) 4 times per week   Current Exercises Include Walking;Light Weights   Do you need help using the phone?  No   Are you deaf or do you have serious difficulty hearing?  Yes   Do you need help with transportation? No   Do you need help shopping? No   Do you need help preparing meals?  No   Do you need help with housework?  No   Do you need help with laundry? No   Do you need help taking your medications? No   Do you need help managing money? No   Do you ever drive or ride in a car without wearing a seat belt? No   Have you felt unusual stress, anger or loneliness in the last month? No   Who do you live with? Spouse   If you need help, do you have trouble finding someone available to you? No   Have you been bothered in the last four weeks by sexual problems? No   Do you have difficulty concentrating, remembering or making decisions? Yes       Age-appropriate Screening Schedule:  Refer to the list below for future screening recommendations based on patient's age, sex and/or medical conditions. Orders for these recommended tests are listed in the plan section. The patient has been provided with a written plan.    Health Maintenance   Topic Date Due   • TDAP/TD VACCINES (1 - Tdap) Never done   • ZOSTER VACCINE (1 of 2) Never done   • LIPID PANEL  04/14/2022   • INFLUENZA VACCINE  08/01/2022              Assessment/Plan   CMS Preventative Services Quick Reference  Risk Factors Identified During Encounter  Hearing Problem  Immunizations Discussed/Encouraged (specific Immunizations; Tdap and Shingrix  The above risks/problems have been discussed with the patient.  Follow up actions/plans if indicated are seen below in the Assessment/Plan Section.  Pertinent information has been shared with the patient in the After Visit Summary.    Diagnoses and all orders for this visit:    1. Medicare annual wellness visit, subsequent (Primary)    2. Primary  hypertension  Assessment & Plan:  Hypertension is Well-controlled.  Continue current treatment regimen.  Blood pressure will be reassessed 6 months.    Orders:  -     CBC & Differential  -     Comprehensive Metabolic Panel  -     TSH Rfx On Abnormal To Free T4    3. Mixed hyperlipidemia  Assessment & Plan:  Lipid abnormalities are Controlled.  Pharmacotherapy as ordered.  Lipids will be reassessed in 1 year.    Orders:  -     Lipid Panel    4. Deafness, unspecified laterality  Assessment & Plan:  Has been evaluated by ENT in the past.  He has difficulty hearing high pitch noises or people who speak in a higher pitch sound        Follow Up:   Return in about 6 months (around 10/14/2022), or if symptoms worsen or fail to improve, for Next scheduled follow up.     An After Visit Summary and PPPS were made available to the patient.

## 2022-04-14 NOTE — ASSESSMENT & PLAN NOTE
Lipid abnormalities are Controlled.  Pharmacotherapy as ordered.  Lipids will be reassessed in 1 year.

## 2022-04-14 NOTE — ASSESSMENT & PLAN NOTE
Has been evaluated by ENT in the past.  He has difficulty hearing high pitch noises or people who speak in a higher pitch sound

## 2022-04-15 LAB
ALBUMIN SERPL-MCNC: 4.5 G/DL (ref 3.5–5.2)
ALBUMIN/GLOB SERPL: 1.7 G/DL
ALP SERPL-CCNC: 66 U/L (ref 39–117)
ALT SERPL W P-5'-P-CCNC: 26 U/L (ref 1–41)
ANION GAP SERPL CALCULATED.3IONS-SCNC: 10.7 MMOL/L (ref 5–15)
AST SERPL-CCNC: 28 U/L (ref 1–40)
BASOPHILS # BLD AUTO: 0.04 10*3/MM3 (ref 0–0.2)
BASOPHILS NFR BLD AUTO: 0.8 % (ref 0–1.5)
BILIRUB SERPL-MCNC: 0.8 MG/DL (ref 0–1.2)
BUN SERPL-MCNC: 13 MG/DL (ref 8–23)
BUN/CREAT SERPL: 10.2 (ref 7–25)
CALCIUM SPEC-SCNC: 9.8 MG/DL (ref 8.6–10.5)
CHLORIDE SERPL-SCNC: 106 MMOL/L (ref 98–107)
CHOLEST SERPL-MCNC: 157 MG/DL (ref 0–200)
CO2 SERPL-SCNC: 25.3 MMOL/L (ref 22–29)
CREAT SERPL-MCNC: 1.27 MG/DL (ref 0.76–1.27)
DEPRECATED RDW RBC AUTO: 39.3 FL (ref 37–54)
EGFRCR SERPLBLD CKD-EPI 2021: 58.9 ML/MIN/1.73
EOSINOPHIL # BLD AUTO: 0.08 10*3/MM3 (ref 0–0.4)
EOSINOPHIL NFR BLD AUTO: 1.5 % (ref 0.3–6.2)
ERYTHROCYTE [DISTWIDTH] IN BLOOD BY AUTOMATED COUNT: 12.3 % (ref 12.3–15.4)
GLOBULIN UR ELPH-MCNC: 2.6 GM/DL
GLUCOSE SERPL-MCNC: 94 MG/DL (ref 65–99)
HCT VFR BLD AUTO: 44.9 % (ref 37.5–51)
HDLC SERPL-MCNC: 41 MG/DL (ref 40–60)
HGB BLD-MCNC: 15.5 G/DL (ref 13–17.7)
IMM GRANULOCYTES # BLD AUTO: 0.01 10*3/MM3 (ref 0–0.05)
IMM GRANULOCYTES NFR BLD AUTO: 0.2 % (ref 0–0.5)
LDLC SERPL CALC-MCNC: 76 MG/DL (ref 0–100)
LDLC/HDLC SERPL: 1.63 {RATIO}
LYMPHOCYTES # BLD AUTO: 2.06 10*3/MM3 (ref 0.7–3.1)
LYMPHOCYTES NFR BLD AUTO: 39.2 % (ref 19.6–45.3)
MCH RBC QN AUTO: 30.3 PG (ref 26.6–33)
MCHC RBC AUTO-ENTMCNC: 34.5 G/DL (ref 31.5–35.7)
MCV RBC AUTO: 87.7 FL (ref 79–97)
MONOCYTES # BLD AUTO: 0.44 10*3/MM3 (ref 0.1–0.9)
MONOCYTES NFR BLD AUTO: 8.4 % (ref 5–12)
NEUTROPHILS NFR BLD AUTO: 2.62 10*3/MM3 (ref 1.7–7)
NEUTROPHILS NFR BLD AUTO: 49.9 % (ref 42.7–76)
NRBC BLD AUTO-RTO: 0 /100 WBC (ref 0–0.2)
PLATELET # BLD AUTO: 209 10*3/MM3 (ref 140–450)
PMV BLD AUTO: 11.5 FL (ref 6–12)
POTASSIUM SERPL-SCNC: 4.2 MMOL/L (ref 3.5–5.2)
PROT SERPL-MCNC: 7.1 G/DL (ref 6–8.5)
RBC # BLD AUTO: 5.12 10*6/MM3 (ref 4.14–5.8)
SODIUM SERPL-SCNC: 142 MMOL/L (ref 136–145)
TRIGL SERPL-MCNC: 246 MG/DL (ref 0–150)
TSH SERPL DL<=0.05 MIU/L-ACNC: 1.55 UIU/ML (ref 0.27–4.2)
VLDLC SERPL-MCNC: 40 MG/DL (ref 5–40)
WBC NRBC COR # BLD: 5.25 10*3/MM3 (ref 3.4–10.8)

## 2022-05-02 PROCEDURE — 86618 LYME DISEASE ANTIBODY: CPT | Performed by: FAMILY MEDICINE

## 2022-05-02 PROCEDURE — 86666 EHRLICHIA ANTIBODY: CPT | Performed by: FAMILY MEDICINE

## 2022-05-02 PROCEDURE — 86757 RICKETTSIA ANTIBODY: CPT | Performed by: FAMILY MEDICINE

## 2022-05-05 ENCOUNTER — TELEPHONE (OUTPATIENT)
Dept: URGENT CARE | Facility: CLINIC | Age: 77
End: 2022-05-05

## 2022-05-05 NOTE — TELEPHONE ENCOUNTER
I called and left patient a voicemail to return call to clinic.  I have reviewed patient's labs.  Patient's [place] spotted fever IgG was positive.  Patient has already been sent a prescription for doxycycline at time of visit.  I have placed a referral for infectious disease.

## 2022-05-09 ENCOUNTER — TELEPHONE (OUTPATIENT)
Dept: URGENT CARE | Facility: CLINIC | Age: 77
End: 2022-05-09

## 2022-05-19 ENCOUNTER — OFFICE VISIT (OUTPATIENT)
Dept: FAMILY MEDICINE CLINIC | Facility: CLINIC | Age: 77
End: 2022-05-19

## 2022-05-19 VITALS
WEIGHT: 181 LBS | TEMPERATURE: 95.5 F | BODY MASS INDEX: 29.09 KG/M2 | OXYGEN SATURATION: 98 % | HEIGHT: 66 IN | DIASTOLIC BLOOD PRESSURE: 72 MMHG | SYSTOLIC BLOOD PRESSURE: 124 MMHG | HEART RATE: 57 BPM

## 2022-05-19 DIAGNOSIS — A77.0 ROCKY MOUNTAIN SPOTTED FEVER: Primary | ICD-10-CM
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
## 2022-05-19 DIAGNOSIS — S80.861D TICK BITE OF RIGHT LOWER LEG, SUBSEQUENT ENCOUNTER: ICD-10-CM

## 2022-05-19 PROCEDURE — 99212 OFFICE O/P EST SF 10 MIN: CPT | Performed by: NURSE PRACTITIONER

## 2022-05-19 NOTE — PROGRESS NOTES
"Chief Complaint  Tick Removal (5/6 tested positive for [place] Spotted Fever )    Subjective          Aristides Pineda presents to [facility]  History of Present Illness  Presents today for a follow-up on [place] spotted fever.  Patient had a tick bite on his right lower leg behind his knee and when she went to urgent care to have the tick removed.  Patient tested positive for [place] spotted fever.  He received a 10-day course of doxycycline.  Patient denies any symptoms of fever chills and rashes and body aches.  Overall patient states he feels well.    Objective   Vital Signs:  /72   Pulse 57   Temp 95.5 °F (35.3 °C)   Ht 167.6 cm (66\")   Wt 82.1 kg (181 lb)   SpO2 98%   BMI 29.21 kg/m²         Physical Exam  Vitals reviewed.   Constitutional:       Appearance: Normal appearance. He is well-developed.   HENT:      Head: Normocephalic and atraumatic.      Right Ear: External ear normal.      Left Ear: External ear normal.      Mouth/Throat:      Pharynx: No oropharyngeal exudate.   Eyes:      Conjunctiva/sclera: Conjunctivae normal.      Pupils: Pupils are equal, round, and reactive to light.   Cardiovascular:      Rate and Rhythm: Normal rate and regular rhythm.      Heart sounds: No murmur heard.    No friction rub. No gallop.   Pulmonary:      Effort: Pulmonary effort is normal.      Breath sounds: Normal breath sounds. No wheezing or rhonchi.   Abdominal:      General: Bowel sounds are normal. There is no distension.      Palpations: Abdomen is soft.      Tenderness: There is no abdominal tenderness.   Skin:     General: Skin is warm and dry.          Neurological:      Mental Status: He is alert and oriented to person, place, and time.      Cranial Nerves: No cranial nerve deficit.        Result Review :                 Assessment and Plan    Diagnoses and all orders for this visit:    1. [place] spotted fever (Primary)    2. Tick bite of " right lower leg, subsequent encounter    Patient received 10-day course treatment of doxycycline.  Patient is asymptomatic.  Discussed using DEET mosquito repellent 3 health checks.         Follow Up   No follow-ups on file.  Patient was given instructions and counseling regarding his condition or for health maintenance advice. Please see specific information pulled into the AVS if appropriate.     Answers for HPI/ROS submitted by the patient on 5/19/2022  What is the primary reason for your visit?: Other  Please describe your symptoms.: Follow up to tick removal and blood test.  Have you had these symptoms before?: No  How long have you been having these symptoms?: 1-2 weeks  Please list any medications you are currently taking for this condition.: Doxycycline.  Please describe any probable cause for these symptoms. : Tick bite.

## 2022-06-13 RX ORDER — ROSUVASTATIN CALCIUM 5 MG/1
5 TABLET, COATED ORAL DAILY
Qty: 90 TABLET | Refills: 1 | Status: SHIPPED | OUTPATIENT
Start: 2022-06-13 | End: 2022-09-13 | Stop reason: SDUPTHER

## 2022-06-13 NOTE — TELEPHONE ENCOUNTER
Caller: Aristides Pineda    Relationship: Self    Best call back number: [phone]     Requested Prescriptions:   Requested Prescriptions     Pending Prescriptions Disp Refills   • rosuvastatin (CRESTOR) 5 MG tablet 90 tablet 1        Pharmacy where request should be sent: [facility] (NOW [facility]) - [facility] [place] [address] - [phone]  - [phone] FX     Additional details provided by patient: HAS 2 TABLETS LEFT    Does the patient have less than a 3 day supply:  [x] Yes  [] No    Alycia YAN Rep   06/13/22 10:44 EDT

## 2022-07-08 DIAGNOSIS — I10 PRIMARY HYPERTENSION: ICD-10-CM

## 2022-07-08 RX ORDER — LOSARTAN POTASSIUM 50 MG/1
TABLET ORAL
Qty: 180 TABLET | Refills: 1 | Status: SHIPPED | OUTPATIENT
Start: 2022-07-08 | End: 2022-10-20 | Stop reason: SDUPTHER

## 2022-09-13 RX ORDER — ROSUVASTATIN CALCIUM 5 MG/1
5 TABLET, COATED ORAL DAILY
Qty: 90 TABLET | Refills: 1 | Status: SHIPPED | OUTPATIENT
Start: 2022-09-13 | End: 2022-10-20 | Stop reason: SDUPTHER

## 2022-10-20 ENCOUNTER — OFFICE VISIT (OUTPATIENT)
Dept: FAMILY MEDICINE CLINIC | Facility: CLINIC | Age: 77
End: 2022-10-20

## 2022-10-20 VITALS
BODY MASS INDEX: 28.35 KG/M2 | DIASTOLIC BLOOD PRESSURE: 68 MMHG | SYSTOLIC BLOOD PRESSURE: 128 MMHG | TEMPERATURE: 97.9 F | HEIGHT: 66 IN | HEART RATE: 55 BPM | OXYGEN SATURATION: 98 % | WEIGHT: 176.4 LBS

## 2022-10-20 DIAGNOSIS — Z11.59 ENCOUNTER FOR HEPATITIS C SCREENING TEST FOR LOW RISK PATIENT: ICD-10-CM

## 2022-10-20 DIAGNOSIS — E78.2 MIXED HYPERLIPIDEMIA: ICD-10-CM

## 2022-10-20 DIAGNOSIS — I10 PRIMARY HYPERTENSION: Primary | ICD-10-CM

## 2022-10-20 LAB
ALBUMIN SERPL-MCNC: 4.3 G/DL (ref 3.5–5.2)
ALBUMIN/GLOB SERPL: 2 G/DL
ALP SERPL-CCNC: 72 U/L (ref 39–117)
ALT SERPL W P-5'-P-CCNC: 27 U/L (ref 1–41)
ANION GAP SERPL CALCULATED.3IONS-SCNC: 10.6 MMOL/L (ref 5–15)
AST SERPL-CCNC: 26 U/L (ref 1–40)
BASOPHILS # BLD AUTO: 0.03 10*3/MM3 (ref 0–0.2)
BASOPHILS NFR BLD AUTO: 0.5 % (ref 0–1.5)
BILIRUB SERPL-MCNC: 0.5 MG/DL (ref 0–1.2)
BUN SERPL-MCNC: 12 MG/DL (ref 8–23)
BUN/CREAT SERPL: 9.6 (ref 7–25)
CALCIUM SPEC-SCNC: 9.1 MG/DL (ref 8.6–10.5)
CHLORIDE SERPL-SCNC: 104 MMOL/L (ref 98–107)
CO2 SERPL-SCNC: 26.4 MMOL/L (ref 22–29)
CREAT SERPL-MCNC: 1.25 MG/DL (ref 0.76–1.27)
DEPRECATED RDW RBC AUTO: 40.8 FL (ref 37–54)
EGFRCR SERPLBLD CKD-EPI 2021: 59.7 ML/MIN/1.73
EOSINOPHIL # BLD AUTO: 0.08 10*3/MM3 (ref 0–0.4)
EOSINOPHIL NFR BLD AUTO: 1.3 % (ref 0.3–6.2)
ERYTHROCYTE [DISTWIDTH] IN BLOOD BY AUTOMATED COUNT: 12.6 % (ref 12.3–15.4)
GLOBULIN UR ELPH-MCNC: 2.1 GM/DL
GLUCOSE SERPL-MCNC: 97 MG/DL (ref 65–99)
HCT VFR BLD AUTO: 43.5 % (ref 37.5–51)
HCV AB SER DONR QL: NORMAL
HGB BLD-MCNC: 15.1 G/DL (ref 13–17.7)
IMM GRANULOCYTES # BLD AUTO: 0.03 10*3/MM3 (ref 0–0.05)
IMM GRANULOCYTES NFR BLD AUTO: 0.5 % (ref 0–0.5)
LYMPHOCYTES # BLD AUTO: 2.15 10*3/MM3 (ref 0.7–3.1)
LYMPHOCYTES NFR BLD AUTO: 35.5 % (ref 19.6–45.3)
MCH RBC QN AUTO: 30.9 PG (ref 26.6–33)
MCHC RBC AUTO-ENTMCNC: 34.7 G/DL (ref 31.5–35.7)
MCV RBC AUTO: 89.1 FL (ref 79–97)
MONOCYTES # BLD AUTO: 0.41 10*3/MM3 (ref 0.1–0.9)
MONOCYTES NFR BLD AUTO: 6.8 % (ref 5–12)
NEUTROPHILS NFR BLD AUTO: 3.35 10*3/MM3 (ref 1.7–7)
NEUTROPHILS NFR BLD AUTO: 55.4 % (ref 42.7–76)
NRBC BLD AUTO-RTO: 0 /100 WBC (ref 0–0.2)
PLATELET # BLD AUTO: 193 10*3/MM3 (ref 140–450)
PMV BLD AUTO: 11.7 FL (ref 6–12)
POTASSIUM SERPL-SCNC: 4 MMOL/L (ref 3.5–5.2)
PROT SERPL-MCNC: 6.4 G/DL (ref 6–8.5)
RBC # BLD AUTO: 4.88 10*6/MM3 (ref 4.14–5.8)
SODIUM SERPL-SCNC: 141 MMOL/L (ref 136–145)
WBC NRBC COR # BLD: 6.05 10*3/MM3 (ref 3.4–10.8)

## 2022-10-20 PROCEDURE — 85025 COMPLETE CBC W/AUTO DIFF WBC: CPT | Performed by: NURSE PRACTITIONER

## 2022-10-20 PROCEDURE — 36415 COLL VENOUS BLD VENIPUNCTURE: CPT | Performed by: NURSE PRACTITIONER

## 2022-10-20 PROCEDURE — 86803 HEPATITIS C AB TEST: CPT | Performed by: NURSE PRACTITIONER

## 2022-10-20 PROCEDURE — 99214 OFFICE O/P EST MOD 30 MIN: CPT | Performed by: NURSE PRACTITIONER

## 2022-10-20 PROCEDURE — 80053 COMPREHEN METABOLIC PANEL: CPT | Performed by: NURSE PRACTITIONER

## 2022-10-20 RX ORDER — ROSUVASTATIN CALCIUM 5 MG/1
5 TABLET, COATED ORAL DAILY
Qty: 90 TABLET | Refills: 3 | Status: SHIPPED | OUTPATIENT
Start: 2022-10-20

## 2022-10-20 RX ORDER — LOSARTAN POTASSIUM 100 MG/1
100 TABLET ORAL DAILY
Qty: 90 TABLET | Refills: 3 | Status: SHIPPED | OUTPATIENT
Start: 2022-10-20

## 2022-10-20 NOTE — PROGRESS NOTES
"Answers for HPI/ROS submitted by the patient on 10/19/2022  What is the primary reason for your visit?: Other  Please describe your symptoms.: I have no symptoms.  Have you had these symptoms before?: Yes  How long have you been having these symptoms?: Greater than 2 weeks    Chief Complaint  Hypertension    Subjective        Aristides Pineda presents to [facility]  History of Present Illness  Presents today for 6-month follow-up on hypertension hyperlipidemia.  He denies chest pain, syncope, palpitations.  When he checks his blood pressure at home BP is approximately 125-135.  Blood pressures well controlled.  Also takes Crestor and fish oil.  6 months ago his triglycerides were elevated.  LDL is well controlled.    Social History     Socioeconomic History   • Marital status:    Tobacco Use   • Smoking status: Former     Packs/day: 1.00     Years: 8.00     Pack years: 8.00     Types: Cigarettes     Quit date: 1970     Years since quittin.8   • Smokeless tobacco: Never   • Tobacco comments:     quit 40 yrs ago   Substance and Sexual Activity   • Alcohol use: Not Currently   • Drug use: Never   • Sexual activity: Not Currently     Partners: Female       Objective   Vital Signs:  /68   Pulse 55   Temp 97.9 °F (36.6 °C)   Ht 167.6 cm (66\")   Wt 80 kg (176 lb 6.4 oz)   SpO2 98%   BMI 28.47 kg/m²   Estimated body mass index is 28.47 kg/m² as calculated from the following:    Height as of this encounter: 167.6 cm (66\").    Weight as of this encounter: 80 kg (176 lb 6.4 oz).          Physical Exam  Vitals reviewed.   Constitutional:       Appearance: Normal appearance. He is well-developed.   HENT:      Head: Normocephalic and atraumatic.      Right Ear: External ear normal.      Left Ear: External ear normal.      Mouth/Throat:      Pharynx: No oropharyngeal exudate.   Eyes:      Conjunctiva/sclera: Conjunctivae normal.      Pupils: Pupils are equal, round, and " reactive to light.   Cardiovascular:      Rate and Rhythm: Normal rate and regular rhythm.      Heart sounds: No murmur heard.    No friction rub. No gallop.   Pulmonary:      Effort: Pulmonary effort is normal.      Breath sounds: Normal breath sounds. No wheezing or rhonchi.   Abdominal:      General: Bowel sounds are normal. There is no distension.      Palpations: Abdomen is soft.      Tenderness: There is no abdominal tenderness.   Skin:     General: Skin is warm and dry.   Neurological:      Mental Status: He is alert and oriented to person, place, and time.   Psychiatric:         Mood and Affect: Mood and affect normal.         Behavior: Behavior normal.         Thought Content: Thought content normal.         Judgment: Judgment normal.        Result Review :    Common labs    Common Labs 4/14/22 4/14/22 4/14/22    1005 1005 1005   Glucose  94    BUN  13    Creatinine  1.27    Sodium  142    Potassium  4.2    Chloride  106    Calcium  9.8    Albumin  4.50    Total Bilirubin  0.8    Alkaline Phosphatase  66    AST (SGOT)  28    ALT (SGPT)  26    WBC 5.25     Hemoglobin 15.5     Hematocrit 44.9     Platelets 209     Total Cholesterol   157   Triglycerides   246 (A)   HDL Cholesterol   41   LDL Cholesterol    76   (A) Abnormal value                      Assessment and Plan   Diagnoses and all orders for this visit:    1. Primary hypertension (Primary)  -     CBC & Differential  -     Comprehensive Metabolic Panel  -     losartan (COZAAR) 100 MG tablet; Take 1 tablet by mouth Daily.  Dispense: 90 tablet; Refill: 3    2. Encounter for hepatitis C screening test for low risk patient  -     Hepatitis C Antibody    3. Mixed hyperlipidemia    Other orders  -     rosuvastatin (CRESTOR) 5 MG tablet; Take 1 tablet by mouth Daily.  Dispense: 90 tablet; Refill: 3    Hypertension and hyperlipidemia are well controlled.  Check a CBC and CMP.  Continue current regimen.  Also check hepatitis C for screening.         Follow Up    Return in about 6 months (around 4/20/2023), or if symptoms worsen or fail to improve, for Next scheduled follow up, hypertension and hyperlipidemia.  Patient was given instructions and counseling regarding his condition or for health maintenance advice. Please see specific information pulled into the AVS if appropriate.

## 2023-04-20 ENCOUNTER — OFFICE VISIT (OUTPATIENT)
Dept: FAMILY MEDICINE CLINIC | Facility: CLINIC | Age: 78
End: 2023-04-20
Payer: MEDICARE

## 2023-04-20 VITALS
HEART RATE: 51 BPM | TEMPERATURE: 97.8 F | DIASTOLIC BLOOD PRESSURE: 66 MMHG | WEIGHT: 181.8 LBS | BODY MASS INDEX: 29.22 KG/M2 | OXYGEN SATURATION: 97 % | HEIGHT: 66 IN | SYSTOLIC BLOOD PRESSURE: 120 MMHG

## 2023-04-20 DIAGNOSIS — E78.2 MIXED HYPERLIPIDEMIA: ICD-10-CM

## 2023-04-20 DIAGNOSIS — Z00.00 MEDICARE ANNUAL WELLNESS VISIT, SUBSEQUENT: Primary | ICD-10-CM

## 2023-04-20 DIAGNOSIS — Z23 NEED FOR PNEUMOCOCCAL VACCINATION: ICD-10-CM

## 2023-04-20 DIAGNOSIS — I10 PRIMARY HYPERTENSION: ICD-10-CM

## 2023-04-20 DIAGNOSIS — L98.9 SKIN LESION OF FACE: ICD-10-CM

## 2023-04-20 DIAGNOSIS — Z12.5 SCREENING PSA (PROSTATE SPECIFIC ANTIGEN): ICD-10-CM

## 2023-04-20 LAB
ALBUMIN SERPL-MCNC: 4.4 G/DL (ref 3.5–5.2)
ALBUMIN/GLOB SERPL: 1.7 G/DL
ALP SERPL-CCNC: 68 U/L (ref 39–117)
ALT SERPL W P-5'-P-CCNC: 29 U/L (ref 1–41)
ANION GAP SERPL CALCULATED.3IONS-SCNC: 9.4 MMOL/L (ref 5–15)
AST SERPL-CCNC: 34 U/L (ref 1–40)
BASOPHILS # BLD AUTO: 0.02 10*3/MM3 (ref 0–0.2)
BASOPHILS NFR BLD AUTO: 0.3 % (ref 0–1.5)
BILIRUB SERPL-MCNC: 0.7 MG/DL (ref 0–1.2)
BUN SERPL-MCNC: 15 MG/DL (ref 8–23)
BUN/CREAT SERPL: 9.3 (ref 7–25)
CALCIUM SPEC-SCNC: 9.8 MG/DL (ref 8.6–10.5)
CHLORIDE SERPL-SCNC: 109 MMOL/L (ref 98–107)
CHOLEST SERPL-MCNC: 146 MG/DL (ref 0–200)
CO2 SERPL-SCNC: 23.6 MMOL/L (ref 22–29)
CREAT SERPL-MCNC: 1.62 MG/DL (ref 0.76–1.27)
DEPRECATED RDW RBC AUTO: 42.3 FL (ref 37–54)
EGFRCR SERPLBLD CKD-EPI 2021: 43.7 ML/MIN/1.73
EOSINOPHIL # BLD AUTO: 0.15 10*3/MM3 (ref 0–0.4)
EOSINOPHIL NFR BLD AUTO: 2.6 % (ref 0.3–6.2)
ERYTHROCYTE [DISTWIDTH] IN BLOOD BY AUTOMATED COUNT: 13.2 % (ref 12.3–15.4)
GLOBULIN UR ELPH-MCNC: 2.6 GM/DL
GLUCOSE SERPL-MCNC: 100 MG/DL (ref 65–99)
HCT VFR BLD AUTO: 45.1 % (ref 37.5–51)
HDLC SERPL-MCNC: 41 MG/DL (ref 40–60)
HGB BLD-MCNC: 15.8 G/DL (ref 13–17.7)
IMM GRANULOCYTES # BLD AUTO: 0.02 10*3/MM3 (ref 0–0.05)
IMM GRANULOCYTES NFR BLD AUTO: 0.3 % (ref 0–0.5)
LDLC SERPL CALC-MCNC: 68 MG/DL (ref 0–100)
LDLC/HDLC SERPL: 1.46 {RATIO}
LYMPHOCYTES # BLD AUTO: 2.16 10*3/MM3 (ref 0.7–3.1)
LYMPHOCYTES NFR BLD AUTO: 37.8 % (ref 19.6–45.3)
MCH RBC QN AUTO: 31.2 PG (ref 26.6–33)
MCHC RBC AUTO-ENTMCNC: 35 G/DL (ref 31.5–35.7)
MCV RBC AUTO: 89 FL (ref 79–97)
MONOCYTES # BLD AUTO: 0.4 10*3/MM3 (ref 0.1–0.9)
MONOCYTES NFR BLD AUTO: 7 % (ref 5–12)
NEUTROPHILS NFR BLD AUTO: 2.97 10*3/MM3 (ref 1.7–7)
NEUTROPHILS NFR BLD AUTO: 52 % (ref 42.7–76)
NRBC BLD AUTO-RTO: 0 /100 WBC (ref 0–0.2)
PLATELET # BLD AUTO: 194 10*3/MM3 (ref 140–450)
PMV BLD AUTO: 12.2 FL (ref 6–12)
POTASSIUM SERPL-SCNC: 4.5 MMOL/L (ref 3.5–5.2)
PROT SERPL-MCNC: 7 G/DL (ref 6–8.5)
PSA SERPL-MCNC: 4.3 NG/ML (ref 0–4)
RBC # BLD AUTO: 5.07 10*6/MM3 (ref 4.14–5.8)
SODIUM SERPL-SCNC: 142 MMOL/L (ref 136–145)
TRIGL SERPL-MCNC: 225 MG/DL (ref 0–150)
TSH SERPL DL<=0.05 MIU/L-ACNC: 1.77 UIU/ML (ref 0.27–4.2)
VLDLC SERPL-MCNC: 37 MG/DL (ref 5–40)
WBC NRBC COR # BLD: 5.72 10*3/MM3 (ref 3.4–10.8)

## 2023-04-20 PROCEDURE — 84443 ASSAY THYROID STIM HORMONE: CPT | Performed by: NURSE PRACTITIONER

## 2023-04-20 PROCEDURE — 85025 COMPLETE CBC W/AUTO DIFF WBC: CPT | Performed by: NURSE PRACTITIONER

## 2023-04-20 PROCEDURE — G0103 PSA SCREENING: HCPCS | Performed by: NURSE PRACTITIONER

## 2023-04-20 PROCEDURE — 80053 COMPREHEN METABOLIC PANEL: CPT | Performed by: NURSE PRACTITIONER

## 2023-04-20 PROCEDURE — 80061 LIPID PANEL: CPT | Performed by: NURSE PRACTITIONER

## 2023-04-20 NOTE — PROGRESS NOTES
The ABCs of the Annual Wellness Visit  Subsequent Medicare Wellness Visit    Subjective    Aristides Pineda is a 77 y.o. male who presents for a Subsequent Medicare Wellness Visit.    The following portions of the patient's history were reviewed and   updated as appropriate: allergies, current medications, past family history, past medical history, past social history, past surgical history and problem list.    Compared to one year ago, the patient feels his physical   health is the same.    Compared to one year ago, the patient feels his mental   health is the same.    Recent Hospitalizations:  He was not admitted to the hospital during the last year.       Current Medical Providers:  Patient Care Team:  James Love APRN as PCP - General (Nurse Practitioner)    Outpatient Medications Prior to Visit   Medication Sig Dispense Refill   • aspirin 81 MG EC tablet Take 1 tablet by mouth Daily.     • losartan (COZAAR) 100 MG tablet Take 1 tablet by mouth Daily. 90 tablet 3   • Omega 3-6-9 Fatty Acids (OMEGA 3-6-9 COMPLEX PO) Fish Oil 1,000 mg (120 mg-180 mg) oral capsule take 1 capsule by oral route once a day (in the morning)   Active     • rosuvastatin (CRESTOR) 5 MG tablet Take 1 tablet by mouth Daily. 90 tablet 3     No facility-administered medications prior to visit.       No opioid medication identified on active medication list. I have reviewed chart for other potential  high risk medication/s and harmful drug interactions in the elderly.          Aspirin is on active medication list. Aspirin use is indicated based on review of current medical condition/s. Pros and cons of this therapy have been discussed today. Benefits of this medication outweigh potential harm.  Patient has been encouraged to continue taking this medication.  .      Patient Active Problem List   Diagnosis   • Arthritis   • Deafness   • Hyperlipidemia   • Hypertension   • Tinnitus     Advance Care Planning   Advance Care Planning     Advance  "Directive is not on file.  ACP discussion was held with the patient during this visit. Patient does not have an advance directive, information provided.     Objective    Vitals:    23 0752   BP: 120/66   BP Location: Left arm   Patient Position: Sitting   Cuff Size: Adult   Pulse: 51   Temp: 97.8 °F (36.6 °C)   TempSrc: Oral   SpO2: 97%   Weight: 82.5 kg (181 lb 12.8 oz)   Height: 167.6 cm (66\")   PainSc: 0-No pain     Estimated body mass index is 29.34 kg/m² as calculated from the following:    Height as of this encounter: 167.6 cm (66\").    Weight as of this encounter: 82.5 kg (181 lb 12.8 oz).    BMI is >= 25 and <30. (Overweight) The following options were offered after discussion;: exercise counseling/recommendations and nutrition counseling/recommendations      Does the patient have evidence of cognitive impairment? No          HEALTH RISK ASSESSMENT    Smoking Status:  Social History     Tobacco Use   Smoking Status Former   • Packs/day: 1.00   • Years: 8.00   • Pack years: 8.00   • Types: Cigarettes   • Quit date: 1970   • Years since quittin.3   Smokeless Tobacco Never   Tobacco Comments    quit 40 yrs ago     Alcohol Consumption:  Social History     Substance and Sexual Activity   Alcohol Use Not Currently     Fall Risk Screen:    ROSLYN Fall Risk Assessment was completed, and patient is at LOW risk for falls.Assessment completed on:2023    Depression Screenin/20/2023     7:57 AM   PHQ-2/PHQ-9 Depression Screening   Little Interest or Pleasure in Doing Things 0-->not at all   Feeling Down, Depressed or Hopeless 0-->not at all   PHQ-9: Brief Depression Severity Measure Score 0       Health Habits and Functional and Cognitive Screenin/20/2023     7:00 AM   Functional & Cognitive Status   Do you have difficulty preparing food and eating? No   Do you have difficulty bathing yourself, getting dressed or grooming yourself? No   Do you have difficulty using the toilet? No "   Do you have difficulty moving around from place to place? No   Do you have trouble with steps or getting out of a bed or a chair? No   Current Diet Well Balanced Diet   Dental Exam Not up to date   Eye Exam Up to date   Exercise (times per week) 4 times per week   Current Exercises Include Walking   Do you need help using the phone?  No   Are you deaf or do you have serious difficulty hearing?  Yes   Do you need help with transportation? No   Do you need help shopping? No   Do you need help preparing meals?  No   Do you need help with housework?  No   Do you need help with laundry? No   Do you need help taking your medications? No   Do you need help managing money? No   Do you ever drive or ride in a car without wearing a seat belt? No   Have you felt unusual stress, anger or loneliness in the last month? No   Who do you live with? Spouse   If you need help, do you have trouble finding someone available to you? No   Have you been bothered in the last four weeks by sexual problems? No   Do you have difficulty concentrating, remembering or making decisions? No       Age-appropriate Screening Schedule:  Refer to the list below for future screening recommendations based on patient's age, sex and/or medical conditions. Orders for these recommended tests are listed in the plan section. The patient has been provided with a written plan.    Health Maintenance   Topic Date Due   • ZOSTER VACCINE (1 of 2) Never done   • Pneumococcal Vaccine 65+ (2 - PPSV23 if available, else PCV20) 01/12/2023   • LIPID PANEL  04/14/2023   • COVID-19 Vaccine (1) 04/22/2024 (Originally 1945)   • INFLUENZA VACCINE  08/01/2023   • ANNUAL WELLNESS VISIT  04/20/2024   • TDAP/TD VACCINES (2 - Tdap) 05/02/2032   • HEPATITIS C SCREENING  Completed   • COLORECTAL CANCER SCREENING  Discontinued                  CMS Preventative Services Quick Reference  Risk Factors Identified During Encounter  Immunizations Discussed/Encouraged: Prevnar 20  "(Pneumococcal 20-valent conjugate) and Shingrix  The above risks/problems have been discussed with the patient.  Pertinent information has been shared with the patient in the After Visit Summary.  An After Visit Summary and PPPS were made available to the patient.    Follow Up:   Next Medicare Wellness visit to be scheduled in 1 year.       Additional E&M Note during same encounter follows:  Patient has multiple medical problems which are significant and separately identifiable that require additional work above and beyond the Medicare Wellness Visit.      Chief Complaint  Hypertension, Hyperlipidemia, Medicare Wellness-subsequent, and growth under right eye    Subjective        HPI  Aristides Pineda is also being seen today for skin lesion on face.  Also 6-month follow-up on hypertension, hyperlipidemia.  Patient reports having skin lesion on his face just below his right eye.  Wife states it is increased in size and has just started changing colors.  Denies pain or itching.  Blood pressures well controlled.  Denies chest pain, syncope, palpitations.  Triglycerides are mildly elevated.  Total cholesterol and LDL are within normal limits.         Objective   Vital Signs:  /66 (BP Location: Left arm, Patient Position: Sitting, Cuff Size: Adult)   Pulse 51   Temp 97.8 °F (36.6 °C) (Oral)   Ht 167.6 cm (66\")   Wt 82.5 kg (181 lb 12.8 oz)   SpO2 97%   BMI 29.34 kg/m²     Physical Exam  Vitals reviewed.   Constitutional:       Appearance: Normal appearance. He is well-developed.   HENT:      Head: Normocephalic and atraumatic.        Right Ear: External ear normal.      Left Ear: External ear normal.      Mouth/Throat:      Pharynx: No oropharyngeal exudate.   Eyes:      Conjunctiva/sclera: Conjunctivae normal.      Pupils: Pupils are equal, round, and reactive to light.   Cardiovascular:      Rate and Rhythm: Normal rate and regular rhythm.      Heart sounds: No murmur heard.    No friction rub. No gallop. "   Pulmonary:      Effort: Pulmonary effort is normal.      Breath sounds: Normal breath sounds. No wheezing or rhonchi.   Abdominal:      General: Bowel sounds are normal. There is no distension.      Palpations: Abdomen is soft.      Tenderness: There is no abdominal tenderness.   Skin:     General: Skin is warm and dry.      Findings: Lesion (Skin lesion on the right lateral side of face just below the eye, pedunculated, half skin lesion pigmentation is dark, approximately 2 x 4 mm) present.   Neurological:      Mental Status: He is alert and oriented to person, place, and time.   Psychiatric:         Mood and Affect: Mood and affect normal.         Behavior: Behavior normal.         Thought Content: Thought content normal.         Judgment: Judgment normal.            Common labs        10/20/2022    08:21   Common Labs   Glucose 97     BUN 12     Creatinine 1.25     Sodium 141     Potassium 4.0     Chloride 104     Calcium 9.1     Albumin 4.30     Total Bilirubin 0.5     Alkaline Phosphatase 72     AST (SGOT) 26     ALT (SGPT) 27     WBC 6.05     Hemoglobin 15.1     Hematocrit 43.5     Platelets 193                           Assessment and Plan   Diagnoses and all orders for this visit:    1. Medicare annual wellness visit, subsequent (Primary)    2. Skin lesion of face  -     Ambulatory Referral to Plastic Surgery    3. Primary hypertension  -     CBC & Differential  -     Comprehensive Metabolic Panel  -     TSH Rfx On Abnormal To Free T4    4. Mixed hyperlipidemia  -     Lipid Panel    5. Screening PSA (prostate specific antigen)  -     PSA Screen    6. Need for pneumococcal vaccination  -     Pneumococcal Conjugate Vaccine 20-Valent (PCV20)             Follow Up   No follow-ups on file.  Patient was given instructions and counseling regarding his condition or for health maintenance advice. Please see specific information pulled into the AVS if appropriate.

## 2023-05-01 DIAGNOSIS — R79.89 ELEVATED SERUM CREATININE: Primary | ICD-10-CM

## 2023-05-05 ENCOUNTER — OFFICE VISIT (OUTPATIENT)
Dept: PLASTIC SURGERY | Facility: CLINIC | Age: 78
End: 2023-05-05
Payer: MEDICARE

## 2023-05-05 VITALS
WEIGHT: 179 LBS | TEMPERATURE: 98.2 F | SYSTOLIC BLOOD PRESSURE: 153 MMHG | HEIGHT: 66 IN | HEART RATE: 56 BPM | OXYGEN SATURATION: 96 % | BODY MASS INDEX: 28.77 KG/M2 | DIASTOLIC BLOOD PRESSURE: 77 MMHG

## 2023-05-05 DIAGNOSIS — D48.5 NEOPLASM OF UNCERTAIN BEHAVIOR OF SKIN OF EYELID: Primary | ICD-10-CM

## 2023-05-05 NOTE — PROGRESS NOTES
"Consult (Skin lesion of face)            History of Present Illness  Aristides Pineda is a 77 y.o. male who presents to [facility] as a consult from James Love for skin lesion on right side of eye. Has been present for 10 years. Has been growing in size. No pain. Does sometimes nick it when trying to shave. No history of skin cancer.  This lesion has not been previously biopsied.  Is concerned because it has recently begun to increase in size as well has developed a growth on the surface.      Subjective       Patient has no known allergies.  Allergies Reconciled.    Review of Systems    All review of system has been reviewed and it  is negative except the ones note above.     Objective     /77 (BP Location: Left arm, Patient Position: Sitting, Cuff Size: Adult)   Pulse 56   Temp 98.2 °F (36.8 °C) (Temporal)   Ht 167.6 cm (65.98\")   Wt 81.2 kg (179 lb)   SpO2 96%   BMI 28.91 kg/m²     Body mass index is 28.91 kg/m².    Physical Exam   HEENT  The patient has a 0.8 x 0.4 cm raised lightly pigmented keratotic lesion on the lateral aspect of the right lower eyelid.  No satellite lesions.  No cervical lymphadenopathy.  No ulceration or bleeding.  The patient is fair complected    Result Review :       Procedures        Assessment and Plan      Diagnoses and all orders for this visit:    1. 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  He understands will be pain with the injection of the local anesthetic.  He will likely develop a black eye as a consequence of this procedure.  This will resolve.  The patient takes 81 mg of aspirin a day.  This was suggested by his physician.  Patient advised to continue the aspirin as recommended by his physician.  It may increase his risk for " bleeding, but hopefully decrease his risk for heart attack or stroke.  He will have a scar on the right lower eyelid that will be permanent.  • Discussed IOP with pt to remove lesion.  • Discussed lesion will be removed and sent to path  • Discussed will remove with narrow margins  • Discussed facial sunscreen to help with spots  • Discussed pt can stay on aspirin during procedure    CPT code 65755  Scribed by Octavia Boggs, acting as a scribe for Pako Hernadez MD, 05/05/23 14:21 EDT.  Pako Hernadez MD's signature on the note affirms that the note adequately documents the care provided.    RTC for IOP skin lesion removal rt eye-30mins    Patient was given instructions and counseling regarding his condition. Please see specific information pulled into the AVS if appropriate.     Pako Hernadez MD  05/05/2023

## 2023-05-16 NOTE — PROGRESS NOTES
"Chief Complaint  Procedure (Rt eye lesion)    Subjective              History of Present Illness  Aristides Pineda is a 78 y.o. male who presents to [facility] as in office procedure for Rt eye lesion excision.    Allergies: Patient has no known allergies.  Allergies Reconciled.    Review of Systems   All review of system has been reviewed and it  is negative except the ones note above.     Objective     /79 (BP Location: Left arm, Patient Position: Sitting, Cuff Size: Adult)   Pulse 55   Temp 98 °F (36.7 °C) (Temporal)   Ht 167.6 cm (65.98\")   Wt 78.9 kg (174 lb)   SpO2 96%   BMI 28.10 kg/m²     Body mass index is 28.1 kg/m².    Physical Exam    Cardiovascular: Normal rate    Pulmonary/Chest: Effort normal    Face:   No interval change on the lightly pigmented keratotic lesion on the lateral aspect of the right lower eyelid    Result Review :             Excision Procedure: Consent obtained. Local injected to site, Lidocaine 1% with epi.  Site prepped with Betadine  in sterile fashion. Site draped in sterile fashion. I dissected down through skin and subcutaneous tissue completely around the 0.7 x 0.3 cm skin specimen.  The excision went down to the apiculate oculi muscle but did not include it., after excision of  was sent from field for pathology. Established hemostasis with direct pressure. Site was thoroughly irrigated. Site closed with 5-0 plain gut suture in an interrupted fashion. Site cleaned with sterile normal saline. Bacitracin applied. The patient tolerated the procedure well with no immediate complications.            Assessment and Plan      Diagnoses and all orders for this visit:    1. 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        Follow Up     Return if symptoms worsen or fail " to improve.    Patient was given instructions and counseling regarding his condition. Please see specific information pulled into the AVS if appropriate.     Octavia Boggs  05/25/2023

## 2023-05-25 ENCOUNTER — PROCEDURE VISIT (OUTPATIENT)
Dept: PLASTIC SURGERY | Facility: CLINIC | Age: 78
End: 2023-05-25
Payer: MEDICARE

## 2023-05-25 VITALS
DIASTOLIC BLOOD PRESSURE: 79 MMHG | HEART RATE: 55 BPM | TEMPERATURE: 98 F | HEIGHT: 66 IN | OXYGEN SATURATION: 96 % | WEIGHT: 174 LBS | SYSTOLIC BLOOD PRESSURE: 136 MMHG | BODY MASS INDEX: 27.97 KG/M2

## 2023-05-25 DIAGNOSIS — D48.5 NEOPLASM OF UNCERTAIN BEHAVIOR OF SKIN OF EYELID: Primary | ICD-10-CM

## 2023-05-25 PROCEDURE — 88305 TISSUE EXAM BY PATHOLOGIST: CPT | Performed by: SURGERY

## 2023-05-30 LAB
CYTO UR: NORMAL
LAB AP CASE REPORT: NORMAL
LAB AP CLINICAL INFORMATION: NORMAL
PATH REPORT.FINAL DX SPEC: NORMAL
PATH REPORT.GROSS SPEC: NORMAL

## 2023-11-03 ENCOUNTER — TELEPHONE (OUTPATIENT)
Dept: FAMILY MEDICINE CLINIC | Facility: CLINIC | Age: 78
End: 2023-11-03

## 2023-11-03 NOTE — TELEPHONE ENCOUNTER
Caller: [facility] - [place], [place] - [address] - [phone] [facility] [phone] FX    Relationship: Pharmacy    Best call back number: [phone] (Work)     Requested Prescriptions:     rosuvastatin (CRESTOR) 5 MG tablet 5 mg, Daily       losartan (COZAAR) 100 MG tablet 100 mg, Daily          Pharmacy where request should be sent:  [facility] - [place], [place] - [address] - [phone] [facility] [phone]     Last office visit with prescribing clinician: Visit date not found   Last telemedicine visit with prescribing clinician: Visit date not found   Next office visit with prescribing clinician: Visit date not found     Additional details provided by patient: [facility] CALLED TO REFILL PRESCRIPTIONS. FORM BEAN PATIENT    Does the patient have less than a 3 day supply:  [] Yes  [x] No    Would you like a call back once the refill request has been completed: [] Yes [] No    If the office needs to give you a call back, can they leave a voicemail: [] Yes [] No    Alycia Gibson Rep   11/03/23 09:23 EDT

## 2023-11-06 NOTE — TELEPHONE ENCOUNTER
Spoke with patient and let him know he would have to establish care with another provider in our office for refills since Zaid Love is no longer in our office. Patient stated he has [employer] and [facility] no longer take it so he stated he is going to find a new provider.